# Patient Record
Sex: MALE | Race: BLACK OR AFRICAN AMERICAN | NOT HISPANIC OR LATINO | Employment: FULL TIME | URBAN - METROPOLITAN AREA
[De-identification: names, ages, dates, MRNs, and addresses within clinical notes are randomized per-mention and may not be internally consistent; named-entity substitution may affect disease eponyms.]

---

## 2017-01-13 ENCOUNTER — TELEPHONE (OUTPATIENT)
Dept: GASTROENTEROLOGY | Facility: CLINIC | Age: 47
End: 2017-01-13

## 2017-01-13 NOTE — TELEPHONE ENCOUNTER
Called pt and advised per Dr Kearney that she would like to see him for labs and f/u to his treatment of harvoni.  Pt verb understanding and made appt 01/19/2017 at 845am with Dr Kearney.  Pt was complaining about appt time and states he works nights and wanted a sooner time.  Advised this is the earliest time I have available.  Message sent to update Dr Kearney.

## 2017-01-13 NOTE — TELEPHONE ENCOUNTER
----- Message from Mimi Kearney MD sent at 1/12/2017  9:37 PM EST -----  I need this patient in ASAP.  He got Harvoni for HCV treatment and never returned for follow up.  Can we get him in the office as soon as possible for labs and evaluation!

## 2017-01-24 ENCOUNTER — TELEPHONE (OUTPATIENT)
Dept: GASTROENTEROLOGY | Facility: CLINIC | Age: 47
End: 2017-01-24

## 2017-01-24 DIAGNOSIS — B18.2 CHRONIC HEPATITIS C WITHOUT HEPATIC COMA (HCC): Primary | ICD-10-CM

## 2017-01-24 NOTE — TELEPHONE ENCOUNTER
----- Message from Mimi Kearney MD sent at 1/24/2017  2:00 PM EST -----  He missed his appt last week.  I really need him to come in.  I also really need labs.  I need to know when he started the Harvoni as well.  I will order labs for him to come get ASAP, and then he needs to be rescheduled with me as well.    Thanks

## 2017-01-24 NOTE — TELEPHONE ENCOUNTER
"Called pt and pt advised that Dr Kearney really needs him to come in for labs and f/u.  Pt verb understanding and states he has an appt this Thursday with Gini GEORGE .  He also states he \"started the Harvoni some time around the beginning of November\".  He did not know the exact date.  Advised would update Dr Kearney.   "

## 2017-01-26 ENCOUNTER — OFFICE VISIT (OUTPATIENT)
Dept: GASTROENTEROLOGY | Facility: CLINIC | Age: 47
End: 2017-01-26

## 2017-01-26 VITALS
BODY MASS INDEX: 45.1 KG/M2 | HEIGHT: 70 IN | SYSTOLIC BLOOD PRESSURE: 126 MMHG | WEIGHT: 315 LBS | DIASTOLIC BLOOD PRESSURE: 72 MMHG

## 2017-01-26 DIAGNOSIS — B18.2 HEP C W/O COMA, CHRONIC (HCC): Primary | ICD-10-CM

## 2017-01-26 PROCEDURE — 99213 OFFICE O/P EST LOW 20 MIN: CPT | Performed by: NURSE PRACTITIONER

## 2017-01-26 NOTE — MR AVS SNAPSHOT
Marlon Barnetty   1/26/2017 8:00 AM   Office Visit    Dept Phone:  [phone]   Encounter #:  [id number]    Provider:  KELLEE Wills   Department:  [facility] GASTROENTEROLOGY                Your Full Care Plan              Today's Medication Changes          These changes are accurate as of: 1/26/17  8:49 AM.  If you have any questions, ask your nurse or doctor.               Stop taking medication(s)listed here:     cyclobenzaprine 5 MG tablet   Commonly known as:  FLEXERIL   Stopped by:  KELLEE Wills           gabapentin 300 MG capsule   Commonly known as:  NEURONTIN   Stopped by:  KELLEE Wills           HARVONI  MG tablet   Generic drug:  Ledipasvir-Sofosbuvir   Stopped by:  KELLEE Wills                      Your Updated Medication List          This list is accurate as of: 1/26/17  8:49 AM.  Always use your most recent med list.                ALA-POOJA 3-0.5 % cream   Generic drug:  Clioquinol-HC       amLODIPine 2.5 MG tablet   Commonly known as:  NORVASC   Take 1 tablet by mouth Daily.       fluocinolone 0.025 % cream   Commonly known as:  SYNALAR       triamcinolone 0.1 % cream   Commonly known as:  KENALOG   Apply 1 application topically daily.               You Were Diagnosed With        Codes Comments    Hep C w/o coma, chronic    -  Primary ICD-10-CM: B18.2  ICD-9-CM: 070.54 Hep C dxed 2005, completed 8 wks Harvoni end of Dec 2016.  Labs today and return to office 3 mos for repeat labs/fu      Instructions     None    Patient Instructions History      Upcoming Appointments     Visit Type Date Time Department    FOLLOW UP 1/26/2017  8:00 AM [facility]    OFFICE VISIT 2/24/2017  7:40 AM [facility]    FOLLOW UP 3/23/2017  8:00 AM [facility]      MyChart Signup     [facility] MyChart allows you to send messages to your doctor, view your test results, renew your prescriptions, schedule  "appointments, and more. To sign up, go to [employer] and click on the Sign Up Now link in the New User? box. Enter your [employer] Activation Code exactly as it appears below along with the last four digits of your Social Security Number and your Date of Birth () to complete the sign-up process. If you do not sign up before the expiration date, you must request a new code.    [employer] Activation Code: [email or web address]  Expires: 2017  5:35 AM    If you have questions, you can email [email or web address] or call [phone] to talk to our [employer] staff. Remember, [employer] is NOT to be used for urgent needs. For medical emergencies, dial 911.               Other Info from Your Visit           Your Appointments     2017  7:40 AM EST   Office Visit with Vnace Spann MD   [facility] PRIMARY CARE (--)    [address]  [facility] [address] [phone]           Arrive 15 minutes prior to appointment.            Mar 23, 2017  8:00 AM EDT   Follow Up with Mimi Kearney MD   [facility] GASTROENTEROLOGY (--)    [address]  [facility] [address] [phone]           Arrive 15 minutes prior to appointment.              Allergies     No Known Allergies      Reason for Visit     Hepatits C     Harvoni treatment           Vital Signs     Blood Pressure Height Weight Body Mass Index Smoking Status       126/72 70\" (177.8 cm) 331 lb (150 kg) 47.49 kg/m2 Heavy Tobacco Smoker       Problems and Diagnoses Noted     Hep C w/o coma, chronic        "

## 2017-01-26 NOTE — PROGRESS NOTES
Chief Complaint   Patient presents with   • Hepatits C   • Harvoni treatment       Marlon Longoria is a  46 y.o. male here for a follow up visit for hep C    HPI    46-year-old -American male patient of Dr. Romero presents today for follow-up for hep C after completing Harvoni treatment.  Patient initially diagnosed with hep C in 2005, etiology was reported intravenous drug use.  Prior office notes indicate the patient was told in 2005 that his viral loads were too low to necessitate treatment.  He was seen in consultation by Dr. Romero and lab work was obtained indicating a positive hep C antibody, genotype of 1A and he was initiated on Harvoni treatment starting at the beginning of November.  He reports compliance with all 8 doses having completed the medication at the end of December.  Unfortunately, he missed several office and lab appointments and follow-up due to his work schedule.  Prior labs demonstrate negative hepatitis A and B antibodies, negative HIV and stable LFTs with low fibrosed or scores.  He presents today with no complaints of weight loss, abdominal pain, changes in his appetite, no jaundice and no bowel or bladder issues.  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  His only complaint is of fatigue which is baseline for him due to working night shift.    Past Medical History   Diagnosis Date   • Back pain    • Hep C w/o coma, chronic    • Hypertension        Current Outpatient Prescriptions   Medication Sig Dispense Refill   • ALA-POOJA 3-0.5 % cream 1 application 2 (Two) Times a Day.  1   • amLODIPine (NORVASC) 2.5 MG tablet Take 1 tablet by mouth Daily. 90 tablet 3   • fluocinolone (SYNALAR) 0.025 % cream Apply  topically 2 (Two) Times a Day.  0   • triamcinolone (KENALOG) 0.1 % cream Apply 1 application topically daily. 15 g 5     No current facility-administered medications for this visit.        PRN Meds:.    No Known Allergies    Social History     Social  "History   • Marital status: Single     Spouse name: N/A   • Number of children: N/A   • Years of education: N/A     Occupational History   • Mental health associate      Social History Main Topics   • Smoking status: Heavy Tobacco Smoker     Packs/day: 1.00     Years: 6.00     Types: Cigars     Start date: 2010   • Smokeless tobacco: Never Used   • Alcohol use No   • Drug use: Yes      Comment: pain pills   • Sexual activity: Defer     Other Topics Concern   • Not on file     Social History Narrative    Single, children ×2       Family History   Problem Relation Age of Onset   • Stroke Mother    • Heart disease Mother    • Heart disease Father        Review of Systems   Constitutional: Positive for appetite change. Negative for activity change.        Increased appetite with Harvoni   HENT: Negative for trouble swallowing.    Cardiovascular: Negative for chest pain and leg swelling.   Gastrointestinal: Negative for abdominal distention, abdominal pain, blood in stool, constipation, diarrhea, nausea and vomiting.   Musculoskeletal: Positive for back pain.   Skin:        Chronic skin changes r/t dermatitis       Vitals:    01/26/17 0811   BP: 126/72     Visit Vitals   • /72   • Ht 70\" (177.8 cm)   • Wt (!) 331 lb (150 kg)   • BMI 47.49 kg/m2     Physical Exam   Constitutional: He is oriented to person, place, and time. He appears well-developed and well-nourished.   Obese   HENT:   Head: Normocephalic and atraumatic.   Eyes:   Mild scleral icterus   Cardiovascular: Normal rate and regular rhythm.    Pulmonary/Chest: Effort normal and breath sounds normal.   Abdominal: Soft. Bowel sounds are normal. He exhibits no distension. There is no tenderness.   Obese, round   Neurological: He is alert and oriented to person, place, and time.   Skin: Skin is warm and dry. Rash noted.   Psychiatric: He has a normal mood and affect.       ASSESSMENT AND PLAN    Marlon was seen today for hepatits c and harvoni " treatment.    Diagnoses and all orders for this visit:    Hep C w/o coma, chronic  Comments:  Hep C dxed 2005, completed 8 wks Harvoni end of Dec 2016.  Labs today and return to office 3 mos for repeat labs/fu     labs performed today that were ordered previously to include a CBC, CMP, and a hep C Quant.  Patient will be contacted with lab results.  Follow-up appointment was made for the end of March when we will plan on additional labs to include a hep C Quant to determine clearing of the virus.

## 2017-01-28 LAB
ALBUMIN SERPL-MCNC: 3.8 G/DL (ref 3.5–5.2)
ALBUMIN/GLOB SERPL: 0.8 G/DL
ALP SERPL-CCNC: 66 U/L (ref 39–117)
ALT SERPL-CCNC: 14 U/L (ref 1–41)
AST SERPL-CCNC: 23 U/L (ref 1–40)
BASOPHILS # BLD AUTO: 0.06 10*3/MM3 (ref 0–0.2)
BASOPHILS NFR BLD AUTO: 0.7 % (ref 0–1.5)
BILIRUB SERPL-MCNC: 0.4 MG/DL (ref 0.1–1.2)
BUN SERPL-MCNC: 9 MG/DL (ref 6–20)
BUN/CREAT SERPL: 12.3 (ref 7–25)
CALCIUM SERPL-MCNC: 9.3 MG/DL (ref 8.6–10.5)
CHLORIDE SERPL-SCNC: 102 MMOL/L (ref 98–107)
CO2 SERPL-SCNC: 25.4 MMOL/L (ref 22–29)
CREAT SERPL-MCNC: 0.73 MG/DL (ref 0.76–1.27)
EOSINOPHIL # BLD AUTO: 0.17 10*3/MM3 (ref 0–0.7)
EOSINOPHIL NFR BLD AUTO: 2.1 % (ref 0.3–6.2)
ERYTHROCYTE [DISTWIDTH] IN BLOOD BY AUTOMATED COUNT: 14.2 % (ref 11.5–14.5)
GLOBULIN SER CALC-MCNC: 4.5 GM/DL
GLUCOSE SERPL-MCNC: 107 MG/DL (ref 65–99)
HCT VFR BLD AUTO: 43.2 % (ref 40.4–52.2)
HCV AB S/CO SERPL IA: >11 S/CO RATIO (ref 0–0.9)
HCV RNA SERPL NAA+PROBE-ACNC: NORMAL IU/ML
HGB BLD-MCNC: 14.3 G/DL (ref 13.7–17.6)
IMM GRANULOCYTES # BLD: 0 10*3/MM3 (ref 0–0.03)
IMM GRANULOCYTES NFR BLD: 0 % (ref 0–0.5)
LYMPHOCYTES # BLD AUTO: 3.57 10*3/MM3 (ref 0.9–4.8)
LYMPHOCYTES NFR BLD AUTO: 44.3 % (ref 19.6–45.3)
MCH RBC QN AUTO: 31 PG (ref 27–32.7)
MCHC RBC AUTO-ENTMCNC: 33.1 G/DL (ref 32.6–36.4)
MCV RBC AUTO: 93.5 FL (ref 79.8–96.2)
MONOCYTES # BLD AUTO: 0.56 10*3/MM3 (ref 0.2–1.2)
MONOCYTES NFR BLD AUTO: 7 % (ref 5–12)
NEUTROPHILS # BLD AUTO: 3.69 10*3/MM3 (ref 1.9–8.1)
NEUTROPHILS NFR BLD AUTO: 45.9 % (ref 42.7–76)
PLATELET # BLD AUTO: 171 10*3/MM3 (ref 140–500)
POTASSIUM SERPL-SCNC: 4.2 MMOL/L (ref 3.5–5.2)
PROT SERPL-MCNC: 8.3 G/DL (ref 6–8.5)
RBC # BLD AUTO: 4.62 10*6/MM3 (ref 4.6–6)
SODIUM SERPL-SCNC: 142 MMOL/L (ref 136–145)
TEST INFORMATION: NORMAL
WBC # BLD AUTO: 8.05 10*3/MM3 (ref 4.5–10.7)

## 2017-01-29 ENCOUNTER — RESULTS ENCOUNTER (OUTPATIENT)
Dept: GASTROENTEROLOGY | Facility: CLINIC | Age: 47
End: 2017-01-29

## 2017-01-29 DIAGNOSIS — B18.2 CHRONIC HEPATITIS C WITHOUT HEPATIC COMA (HCC): ICD-10-CM

## 2017-01-31 ENCOUNTER — TELEPHONE (OUTPATIENT)
Dept: GASTROENTEROLOGY | Facility: CLINIC | Age: 47
End: 2017-01-31

## 2017-01-31 NOTE — TELEPHONE ENCOUNTER
----- Message from Mimi Kearney MD sent at 1/29/2017 11:54 AM EST -----  Please let him know that his liver labs have normalized and he no longer has hep C virus in his blood .  I will follow up with him in March as we will need to again check his viral levels to make sure he has been cured of the virus.  thanks

## 2017-02-24 ENCOUNTER — OFFICE VISIT (OUTPATIENT)
Dept: INTERNAL MEDICINE | Age: 47
End: 2017-02-24

## 2017-02-24 VITALS
DIASTOLIC BLOOD PRESSURE: 94 MMHG | SYSTOLIC BLOOD PRESSURE: 134 MMHG | HEART RATE: 96 BPM | BODY MASS INDEX: 45.1 KG/M2 | HEIGHT: 70 IN | WEIGHT: 315 LBS | TEMPERATURE: 96.7 F | OXYGEN SATURATION: 97 %

## 2017-02-24 DIAGNOSIS — I10 ESSENTIAL HYPERTENSION: Primary | ICD-10-CM

## 2017-02-24 DIAGNOSIS — E66.9 OBESITY, UNSPECIFIED OBESITY SEVERITY, UNSPECIFIED OBESITY TYPE: ICD-10-CM

## 2017-02-24 DIAGNOSIS — N52.9 ERECTILE DYSFUNCTION, UNSPECIFIED ERECTILE DYSFUNCTION TYPE: ICD-10-CM

## 2017-02-24 PROCEDURE — 99214 OFFICE O/P EST MOD 30 MIN: CPT | Performed by: INTERNAL MEDICINE

## 2017-02-24 RX ORDER — AMLODIPINE BESYLATE 2.5 MG/1
5 TABLET ORAL DAILY
Qty: 90 TABLET | Refills: 3 | COMMUNITY
Start: 2017-02-24 | End: 2017-05-08 | Stop reason: SDUPTHER

## 2017-03-23 ENCOUNTER — TELEPHONE (OUTPATIENT)
Dept: GASTROENTEROLOGY | Facility: CLINIC | Age: 47
End: 2017-03-23

## 2017-03-23 DIAGNOSIS — B18.2 CHRONIC HEPATITIS C WITHOUT HEPATIC COMA (HCC): Primary | ICD-10-CM

## 2017-03-23 NOTE — TELEPHONE ENCOUNTER
----- Message from Mimi Kearney MD sent at 3/23/2017 11:51 AM EDT -----  He no showed again for his appointment today.  He does work nights and I think struggles to make his appointments.  He does absolutely need to have labs redrawn.  I am ordering those for him -- can we contact him and have him come in at his convenience in the next 2 weeks to get those drawn please.  Thank you

## 2017-03-28 ENCOUNTER — RESULTS ENCOUNTER (OUTPATIENT)
Dept: GASTROENTEROLOGY | Facility: CLINIC | Age: 47
End: 2017-03-28

## 2017-03-28 DIAGNOSIS — B18.2 CHRONIC HEPATITIS C WITHOUT HEPATIC COMA (HCC): ICD-10-CM

## 2017-03-28 NOTE — TELEPHONE ENCOUNTER
Call to pt.  Advise per Dr Kearney that with missing appt of 3/23/17, absolutely needs to have labs drawn.  Can be obtained within the next two weeks.  Pt verb understanding.  Appt scheduled for 4/4/17 @ 1000.

## 2017-03-30 ENCOUNTER — OFFICE VISIT (OUTPATIENT)
Dept: INTERNAL MEDICINE | Age: 47
End: 2017-03-30

## 2017-03-30 VITALS
OXYGEN SATURATION: 98 % | HEART RATE: 79 BPM | SYSTOLIC BLOOD PRESSURE: 126 MMHG | HEIGHT: 70 IN | BODY MASS INDEX: 45.1 KG/M2 | WEIGHT: 315 LBS | TEMPERATURE: 97.3 F | DIASTOLIC BLOOD PRESSURE: 88 MMHG

## 2017-03-30 DIAGNOSIS — I10 ESSENTIAL HYPERTENSION: Primary | ICD-10-CM

## 2017-03-30 DIAGNOSIS — Z71.82 EXERCISE COUNSELING: ICD-10-CM

## 2017-03-30 PROCEDURE — 99213 OFFICE O/P EST LOW 20 MIN: CPT | Performed by: INTERNAL MEDICINE

## 2017-03-30 NOTE — PROGRESS NOTES
Subjective   Marlon Longoria is a 46 y.o. male.     History of Present Illness patient's last seen by me roughly 6 weeks ago.  That time his blood pressure was elevated.  We did increase his Norvasc to 5 mg per day was asked to return today to check blood pressure.  He notes spotty compliance with medication but is watching his salt.  Breanna lites were done in January 2017, lipids August 2016, all values are acceptable.    Exercise: There is a family history of heart disease, patient would like to start exercising and we did discuss the utility of having a preexercise stress test once his blood pressure is under control.  He would like to proceed with that at this time.      Review of Systems   Respiratory: Negative for chest tightness and shortness of breath.    Cardiovascular: Negative for chest pain and palpitations.   Neurological: Negative for dizziness, syncope, speech difficulty, weakness, light-headedness and headaches.     Medication list, problem list, past medical history reviewed noted to made today.    Objective   Physical Exam   Constitutional: He is oriented to person, place, and time. He appears well-developed. No distress.   obese   Cardiovascular: Normal rate, regular rhythm, normal heart sounds and intact distal pulses.  Exam reveals no gallop and no friction rub.    No murmur heard.  Pulmonary/Chest: Effort normal and breath sounds normal. He has no wheezes. He has no rales.   Musculoskeletal: He exhibits no edema.   Neurological: He is alert and oriented to person, place, and time.   Skin: Skin is warm and dry. No rash noted.   Psychiatric: He has a normal mood and affect. His behavior is normal. Judgment and thought content normal.   Nursing note and vitals reviewed.      Assessment/Plan   Marlon was seen today for hypertension.    Diagnoses and all orders for this visit:    Essential hypertension    Exercise counseling  -     Cardiovascular stress test       #1 hypertension stable on current  medical regimen.  Plan: Same meds, blood pressure check 6 months.    #2 exercise counseling, stress test, follow-up results by mail and advise patient as to safe level of metabolic expenditure.

## 2017-05-08 DIAGNOSIS — I10 ESSENTIAL HYPERTENSION: Primary | ICD-10-CM

## 2017-05-08 RX ORDER — AMLODIPINE BESYLATE 5 MG/1
5 TABLET ORAL DAILY
Qty: 90 TABLET | Refills: 1 | Status: SHIPPED | OUTPATIENT
Start: 2017-05-08 | End: 2019-05-28

## 2017-06-13 DIAGNOSIS — B18.2 CHRONIC HEPATITIS C WITHOUT HEPATIC COMA (HCC): Primary | ICD-10-CM

## 2017-06-14 ENCOUNTER — TELEPHONE (OUTPATIENT)
Dept: GASTROENTEROLOGY | Facility: CLINIC | Age: 47
End: 2017-06-14

## 2017-06-14 NOTE — TELEPHONE ENCOUNTER
----- Message from Mimi Kearney MD sent at 6/13/2017  5:09 PM EDT -----  This dude needs to come in for labs-- treated for Hep C and he has only followed up 1x.  Also needs office follow up. Thanks

## 2017-06-16 NOTE — TELEPHONE ENCOUNTER
No emergency contact, and no alternative # available on HIPPA form.  VM to pt with urgent message to contact office.

## 2017-06-18 ENCOUNTER — RESULTS ENCOUNTER (OUTPATIENT)
Dept: GASTROENTEROLOGY | Facility: CLINIC | Age: 47
End: 2017-06-18

## 2017-06-18 DIAGNOSIS — B18.2 CHRONIC HEPATITIS C WITHOUT HEPATIC COMA (HCC): ICD-10-CM

## 2017-06-20 NOTE — TELEPHONE ENCOUNTER
Prior message received that pt works 3 shift.  Attempt call at [address] [facility] left with urgent request for pt to schedule f/u appt with DR Kearney.

## 2017-11-10 RX ORDER — AMLODIPINE BESYLATE 2.5 MG/1
TABLET ORAL
Qty: 90 TABLET | Refills: 3 | Status: SHIPPED | OUTPATIENT
Start: 2017-11-10 | End: 2018-12-16 | Stop reason: SDUPTHER

## 2018-12-18 RX ORDER — AMLODIPINE BESYLATE 2.5 MG/1
2.5 TABLET ORAL DAILY
Qty: 90 TABLET | Refills: 0 | Status: SHIPPED | OUTPATIENT
Start: 2018-12-18 | End: 2019-04-15 | Stop reason: DRUGHIGH

## 2019-04-11 ENCOUNTER — HOSPITAL ENCOUNTER (EMERGENCY)
Facility: HOSPITAL | Age: 49
Discharge: HOME | End: 2019-04-11
Admitting: EMERGENCY MEDICINE

## 2019-04-11 ENCOUNTER — APPOINTMENT (OUTPATIENT)
Dept: CT IMAGING | Facility: HOSPITAL | Age: 49
End: 2019-04-11

## 2019-04-11 VITALS
WEIGHT: 315 LBS | BODY MASS INDEX: 45.1 KG/M2 | OXYGEN SATURATION: 95 % | TEMPERATURE: 98.4 F | HEART RATE: 82 BPM | RESPIRATION RATE: 18 BRPM | SYSTOLIC BLOOD PRESSURE: 157 MMHG | DIASTOLIC BLOOD PRESSURE: 102 MMHG | HEIGHT: 70 IN

## 2019-04-11 DIAGNOSIS — R10.9 RIGHT FLANK PAIN: Primary | ICD-10-CM

## 2019-04-11 DIAGNOSIS — K74.60 CIRRHOSIS OF LIVER WITHOUT ASCITES, UNSPECIFIED HEPATIC CIRRHOSIS TYPE (HCC): ICD-10-CM

## 2019-04-11 DIAGNOSIS — E87.1 HYPONATREMIA: ICD-10-CM

## 2019-04-11 DIAGNOSIS — K76.9 LIVER LESION: ICD-10-CM

## 2019-04-11 LAB
ALBUMIN SERPL-MCNC: 3.7 G/DL (ref 3.5–5.2)
ALBUMIN/GLOB SERPL: 0.6 G/DL
ALP SERPL-CCNC: 59 U/L (ref 39–117)
ALT SERPL W P-5'-P-CCNC: 10 U/L (ref 1–41)
ANION GAP SERPL CALCULATED.3IONS-SCNC: 11.1 MMOL/L
AST SERPL-CCNC: 13 U/L (ref 1–40)
BACTERIA UR QL AUTO: ABNORMAL /HPF
BASOPHILS # BLD AUTO: 0.02 10*3/MM3 (ref 0–0.2)
BASOPHILS NFR BLD AUTO: 0.2 % (ref 0–1.5)
BILIRUB SERPL-MCNC: 0.8 MG/DL (ref 0.2–1.2)
BILIRUB UR QL STRIP: NEGATIVE
BUN BLD-MCNC: 11 MG/DL (ref 6–20)
BUN/CREAT SERPL: 17.7 (ref 7–25)
CALCIUM SPEC-SCNC: 9.9 MG/DL (ref 8.6–10.5)
CHLORIDE SERPL-SCNC: 88 MMOL/L (ref 98–107)
CLARITY UR: CLEAR
CO2 SERPL-SCNC: 26.9 MMOL/L (ref 22–29)
COLOR UR: ABNORMAL
CREAT BLD-MCNC: 0.62 MG/DL (ref 0.76–1.27)
DEPRECATED RDW RBC AUTO: 45.7 FL (ref 37–54)
EOSINOPHIL # BLD AUTO: 0 10*3/MM3 (ref 0–0.4)
EOSINOPHIL NFR BLD AUTO: 0 % (ref 0.3–6.2)
ERYTHROCYTE [DISTWIDTH] IN BLOOD BY AUTOMATED COUNT: 14.5 % (ref 12.3–15.4)
GFR SERPL CREATININE-BSD FRML MDRD: >150 ML/MIN/1.73
GLOBULIN UR ELPH-MCNC: 6.2 GM/DL
GLUCOSE BLD-MCNC: 129 MG/DL (ref 65–99)
GLUCOSE UR STRIP-MCNC: NEGATIVE MG/DL
HCT VFR BLD AUTO: 43.9 % (ref 37.5–51)
HGB BLD-MCNC: 13.7 G/DL (ref 13–17.7)
HGB UR QL STRIP.AUTO: ABNORMAL
HOLD SPECIMEN: NORMAL
HOLD SPECIMEN: NORMAL
HYALINE CASTS UR QL AUTO: ABNORMAL /LPF
IMM GRANULOCYTES # BLD AUTO: 0.08 10*3/MM3 (ref 0–0.05)
IMM GRANULOCYTES NFR BLD AUTO: 0.6 % (ref 0–0.5)
KETONES UR QL STRIP: ABNORMAL
LEUKOCYTE ESTERASE UR QL STRIP.AUTO: NEGATIVE
LIPASE SERPL-CCNC: 11 U/L (ref 13–60)
LYMPHOCYTES # BLD AUTO: 1.07 10*3/MM3 (ref 0.7–3.1)
LYMPHOCYTES NFR BLD AUTO: 8.2 % (ref 19.6–45.3)
MCH RBC QN AUTO: 26.8 PG (ref 26.6–33)
MCHC RBC AUTO-ENTMCNC: 31.2 G/DL (ref 31.5–35.7)
MCV RBC AUTO: 85.9 FL (ref 79–97)
MONOCYTES # BLD AUTO: 0.79 10*3/MM3 (ref 0.1–0.9)
MONOCYTES NFR BLD AUTO: 6 % (ref 5–12)
NEUTROPHILS # BLD AUTO: 11.15 10*3/MM3 (ref 1.4–7)
NEUTROPHILS NFR BLD AUTO: 85 % (ref 42.7–76)
NITRITE UR QL STRIP: NEGATIVE
NRBC BLD AUTO-RTO: 0 /100 WBC (ref 0–0)
PH UR STRIP.AUTO: 5.5 [PH] (ref 5–8)
PLATELET # BLD AUTO: 221 10*3/MM3 (ref 140–450)
PMV BLD AUTO: 9.7 FL (ref 6–12)
POTASSIUM BLD-SCNC: 4.1 MMOL/L (ref 3.5–5.2)
PROT SERPL-MCNC: 9.9 G/DL (ref 6–8.5)
PROT UR QL STRIP: ABNORMAL
RBC # BLD AUTO: 5.11 10*6/MM3 (ref 4.14–5.8)
RBC # UR: ABNORMAL /HPF
REF LAB TEST METHOD: ABNORMAL
SODIUM BLD-SCNC: 126 MMOL/L (ref 136–145)
SP GR UR STRIP: >=1.03 (ref 1–1.03)
SQUAMOUS #/AREA URNS HPF: ABNORMAL /HPF
UROBILINOGEN UR QL STRIP: ABNORMAL
WBC NRBC COR # BLD: 13.11 10*3/MM3 (ref 3.4–10.8)
WBC UR QL AUTO: ABNORMAL /HPF
WHOLE BLOOD HOLD SPECIMEN: NORMAL
WHOLE BLOOD HOLD SPECIMEN: NORMAL

## 2019-04-11 PROCEDURE — 25010000002 KETOROLAC TROMETHAMINE PER 15 MG: Performed by: EMERGENCY MEDICINE

## 2019-04-11 PROCEDURE — 25010000002 IOPAMIDOL 61 % SOLUTION: Performed by: NURSE PRACTITIONER

## 2019-04-11 PROCEDURE — 74177 CT ABD & PELVIS W/CONTRAST: CPT

## 2019-04-11 PROCEDURE — 25010000002 ONDANSETRON PER 1 MG: Performed by: NURSE PRACTITIONER

## 2019-04-11 PROCEDURE — 99284 EMERGENCY DEPT VISIT MOD MDM: CPT

## 2019-04-11 PROCEDURE — 25010000002 HYDROMORPHONE 1 MG/ML SOLUTION: Performed by: NURSE PRACTITIONER

## 2019-04-11 PROCEDURE — 85025 COMPLETE CBC W/AUTO DIFF WBC: CPT | Performed by: NURSE PRACTITIONER

## 2019-04-11 PROCEDURE — 96376 TX/PRO/DX INJ SAME DRUG ADON: CPT

## 2019-04-11 PROCEDURE — 96374 THER/PROPH/DIAG INJ IV PUSH: CPT

## 2019-04-11 PROCEDURE — 80053 COMPREHEN METABOLIC PANEL: CPT | Performed by: NURSE PRACTITIONER

## 2019-04-11 PROCEDURE — 25010000002 MORPHINE PER 10 MG: Performed by: NURSE PRACTITIONER

## 2019-04-11 PROCEDURE — 83690 ASSAY OF LIPASE: CPT | Performed by: NURSE PRACTITIONER

## 2019-04-11 PROCEDURE — 81001 URINALYSIS AUTO W/SCOPE: CPT | Performed by: NURSE PRACTITIONER

## 2019-04-11 PROCEDURE — 25010000002 ONDANSETRON PER 1 MG: Performed by: EMERGENCY MEDICINE

## 2019-04-11 PROCEDURE — 96375 TX/PRO/DX INJ NEW DRUG ADDON: CPT

## 2019-04-11 RX ORDER — ONDANSETRON 2 MG/ML
4 INJECTION INTRAMUSCULAR; INTRAVENOUS ONCE
Status: COMPLETED | OUTPATIENT
Start: 2019-04-11 | End: 2019-04-11

## 2019-04-11 RX ORDER — NAPROXEN 500 MG/1
500 TABLET ORAL 2 TIMES DAILY WITH MEALS
Qty: 30 TABLET | Refills: 0 | Status: SHIPPED | OUTPATIENT
Start: 2019-04-11 | End: 2019-06-03

## 2019-04-11 RX ORDER — KETOROLAC TROMETHAMINE 30 MG/ML
15 INJECTION, SOLUTION INTRAMUSCULAR; INTRAVENOUS ONCE
Status: COMPLETED | OUTPATIENT
Start: 2019-04-11 | End: 2019-04-11

## 2019-04-11 RX ORDER — OXYCODONE HYDROCHLORIDE AND ACETAMINOPHEN 5; 325 MG/1; MG/1
1-2 TABLET ORAL EVERY 6 HOURS PRN
Qty: 12 TABLET | Refills: 0 | Status: ON HOLD | OUTPATIENT
Start: 2019-04-11 | End: 2019-04-20 | Stop reason: SDUPTHER

## 2019-04-11 RX ORDER — MORPHINE SULFATE 2 MG/ML
4 INJECTION, SOLUTION INTRAMUSCULAR; INTRAVENOUS ONCE
Status: COMPLETED | OUTPATIENT
Start: 2019-04-11 | End: 2019-04-11

## 2019-04-11 RX ADMIN — KETOROLAC TROMETHAMINE 15 MG: 30 INJECTION INTRAMUSCULAR; INTRAVENOUS at 20:42

## 2019-04-11 RX ADMIN — SODIUM CHLORIDE 1000 ML: 9 INJECTION, SOLUTION INTRAVENOUS at 18:50

## 2019-04-11 RX ADMIN — ONDANSETRON 4 MG: 2 INJECTION INTRAMUSCULAR; INTRAVENOUS at 20:23

## 2019-04-11 RX ADMIN — IOPAMIDOL 85 ML: 612 INJECTION, SOLUTION INTRAVENOUS at 19:48

## 2019-04-11 RX ADMIN — MORPHINE SULFATE 4 MG: 2 INJECTION, SOLUTION INTRAMUSCULAR; INTRAVENOUS at 18:49

## 2019-04-11 RX ADMIN — HYDROMORPHONE HYDROCHLORIDE 1 MG: 1 INJECTION, SOLUTION INTRAMUSCULAR; INTRAVENOUS; SUBCUTANEOUS at 20:13

## 2019-04-11 RX ADMIN — ONDANSETRON 4 MG: 2 INJECTION INTRAMUSCULAR; INTRAVENOUS at 18:49

## 2019-04-11 NOTE — ED PROVIDER NOTES
EMERGENCY DEPARTMENT ENCOUNTER    Room Number:  40/40  Date seen:  4/13/2019  Time seen: 6:20 PM  PCP: Vance Spann MD (Inactive)   History provided by- patient    HPI:  Chief complaint: back pain  Context:Marlon Longoria is a 48 y.o. male who has hx of HTN, hepatitis C, and lupus. He presents to the ED with c/o diffuse lower back pain radiating to the right abdomen that started about 2 days ago. It is exacerbated by movement. He also reports having nausea. He denies past hx of back problems, past hx of kidney stones, recent injury or trauma, vomiting, diarrhea, pain and difficulty with urination, bladder incontinence, bowel incontinence, saddle anesthesia, chest pain, dyspnea, sensory loss, motor loss, fever, and chills. Pt has no other complaints at this time.      Onset: gradual  Location: diffuse lower back  Radiation: right abdomen  Duration: started about 2 days ago  Timing: constant  Character: pain  Aggravating Factors: movement  Alleviating Factors: remaining still  Severity: moderate    MEDICAL RECORD REVIEW  2 years ago, sodium was 142 and WBC count was 8.05.       ALLERGIES  Patient has no known allergies.    PAST MEDICAL HISTORY  Active Ambulatory Problems     Diagnosis Date Noted   • Hypertension 08/23/2016   • Hep C w/o coma, chronic (CMS/HCC) 08/23/2016   • Encounter for immunization 08/23/2016   • Dermatitis 08/23/2016   • Back pain 08/23/2016   • Abnormal transaminases 08/30/2016   • Obesity 10/24/2016   • Erectile dysfunction 02/24/2017     Resolved Ambulatory Problems     Diagnosis Date Noted   • No Resolved Ambulatory Problems     Past Medical History:   Diagnosis Date   • Back pain    • Hep C w/o coma, chronic (CMS/HCC)    • Hypertension        PAST SURGICAL HISTORY  History reviewed. No pertinent surgical history.    FAMILY HISTORY  Family History   Problem Relation Age of Onset   • Stroke Mother    • Heart disease Mother    • Heart disease Father        SOCIAL HISTORY  Social History      Socioeconomic History   • Marital status: Single     Spouse name: Not on file   • Number of children: Not on file   • Years of education: Not on file   • Highest education level: Not on file   Occupational History   • Occupation: Mental health associate   Tobacco Use   • Smoking status: Heavy Tobacco Smoker     Packs/day: 1.00     Years: 6.00     Pack years: 6.00     Types: Cigars     Start date: 2010   • Smokeless tobacco: Never Used   • Tobacco comment: Pt declined medication at this time.   Substance and Sexual Activity   • Alcohol use: No   • Drug use: Yes     Comment: pain pills   • Sexual activity: Defer   Social History Narrative    Single, children ×2       REVIEW OF SYSTEMS  Review of Systems   Constitutional: Negative for chills and fever.   HENT: Negative for sore throat.    Respiratory: Negative for shortness of breath.    Cardiovascular: Negative for chest pain.   Gastrointestinal: Positive for abdominal pain (diffuse lower back pain radiating to the right abdomen) and nausea. Negative for diarrhea and vomiting.   Genitourinary: Negative for difficulty urinating and dysuria.   Musculoskeletal: Positive for back pain (diffuse lower back pain radiating to the right abdomen).   Skin: Negative for rash.   Neurological: Negative for dizziness, weakness and numbness.   Psychiatric/Behavioral: The patient is not nervous/anxious.        PHYSICAL EXAM  ED Triage Vitals   Temp Heart Rate Resp BP SpO2   04/11/19 1751 04/11/19 1720 04/11/19 1720 04/11/19 1720 04/11/19 1720   98.4 °F (36.9 °C) 86 16 160/100 98 % WNL      Temp src Heart Rate Source Patient Position BP Location FiO2 (%)   04/11/19 1751 -- -- -- --   Tympanic         Physical Exam   Constitutional: He is oriented to person, place, and time. He appears distressed (mild due to pain).   HENT:   Head: Normocephalic.   Mouth/Throat: Mucous membranes are normal.   Eyes: EOM are normal. Pupils are equal, round, and reactive to light.   Neck: Normal range  of motion. Neck supple.   Cardiovascular: Normal rate, regular rhythm and normal heart sounds.   Pulmonary/Chest: Effort normal and breath sounds normal. No respiratory distress. He has no decreased breath sounds. He has no wheezes. He has no rhonchi. He has no rales.   Abdominal: Soft. There is tenderness in the right lower quadrant. There is no rebound and no guarding.   Musculoskeletal:   Diffuse lower back tenderness   Neurological: He is alert and oriented to person, place, and time. He has normal sensation.   Skin: Skin is warm and dry.   Psychiatric: Mood and affect normal.   Nursing note and vitals reviewed.      LAB RESULTS  No results found for this or any previous visit (from the past 24 hour(s)).    I ordered the above labs and reviewed the results    RADIOLOGY  CT Abdomen Pelvis With Contrast   Final Result           1. Indeterminate hepatic mass, possibility of malignant neoplasm not   excluded, further evaluation recommended.   2. Cirrhotic change of the liver. Hepatosplenomegaly.   3. Nonobstructive nephrolithiasis. Left renal cyst.   4. No acute inflammatory process of bowel identified, follow up as   indications persist.       Discussed by telephone with Dr. Seaman for Lesa Shin at 2010,   04/11/2019.               This report was finalized on 4/11/2019 8:14 PM by Dr. Marty Yun M.D.                MEDICATIONS GIVEN IN ER  Medications   sodium chloride 0.9 % bolus 1,000 mL (0 mL Intravenous Stopped 4/11/19 2121)   ondansetron (ZOFRAN) injection 4 mg (4 mg Intravenous Given 4/11/19 1849)   morphine injection 4 mg (4 mg Intravenous Given 4/11/19 1849)   iopamidol (ISOVUE-300) 61 % injection 100 mL (85 mL Intravenous Given by Other 4/11/19 1948)   HYDROmorphone (DILAUDID) injection 1 mg (1 mg Intravenous Given 4/11/19 2013)   ondansetron (ZOFRAN) injection 4 mg (4 mg Intravenous Given 4/11/19 2023)   ketorolac (TORADOL) injection 15 mg (15 mg Intravenous Given 4/11/19 2042)            PROCEDURES  Procedures      PROGRESS AND CONSULTS    Progress Notes:    ED Course as of Apr 13 0655   Thu Apr 11, 2019 1955 Sodium: (!) 126 [JS]      ED Course User Index  [JS] Lesa Shin, APRN     1823- Ordered morphine for pain, zofran for nausea, and IV fluids for hydration. Ordered CT abd/pel, blood work, lipase, and UA for further evaluation.     1957- Reviewed pt's history and workup with Dr. Seaman.  After a bedside evaluation; Dr Seaman agrees with the plan of care.     1959- Per RN, pt still has pain. Ordered dilaudid.     2000- Transferred care to Dr Seaman. Pending CT abd/pel and final disposition. See Dr Seaman's note for details.       DIAGNOSIS  Final diagnoses:   Right flank pain   Cirrhosis of liver without ascites, unspecified hepatic cirrhosis type (CMS/HCC)   Hyponatremia   Liver lesion          Stevo, Adnin  04/11/19 2018       Lesa Shin, APRN  04/13/19 0656

## 2019-04-11 NOTE — ED NOTES
Pt requesting more pain medication. Provider made aware. No new orders     Zaynab Scott, RN  04/11/19 1958

## 2019-04-12 NOTE — DISCHARGE INSTRUCTIONS
I have given medicine to take as needed for pain in your right flank.  I recommend no strenuous activity involving the lower back until improved.  Your CT scan shows an abnormality of the liver, this may need further workup including a possible MRI of the liver.  Please contact your primary care provider or GI, Dr. Mimi Romero for further workup.

## 2019-04-12 NOTE — ED PROVIDER NOTES
MD ATTESTATION NOTE    The URVASHI and I have discussed this patient's history, physical exam, and treatment plan.  I have reviewed the documentation and personally had a face to face interaction with the patient. I affirm the documentation and agree with the treatment and plan.  The attached note describes my personal findings.      History  48-year-old male with a history of hepatitis hypertension lupus presents with right-sided back pain rating towards the right abdomen.  I discussed the case and findings with JAZMINE Shin and have reviewed the pertinent labs and CT scan    Physical Exam  Alert oriented x3, mild distress  Lungs clear to auscultation bilaterally  Heart rate and rhythm  Abdomen is soft there is mild right-sided abdominal tenderness  Back there is pain with range of motion of the back predominantly on the right side  Neuro strength and sensation is normal in bilateral lower extremities    Disposition  Initially history was suggestive of possible renal stone however there are no obstructing stones on CT scan.  CT did show cirrhosis and questionable liver lesion.  I have discussed with the patient the findings of the CT scan and the need for follow-up of this liver lesion.  He sees Dr. Romero is a GI doctor and Dr. Spann is a primary care doctor.  I suspect that his right back pain is musculoskeletal in nature and will treat with pain meds.  His hyponatremia is noted and worse from 2017 but seems to be asymptomatic here and likely related to his chronic liver disease, I do not feel admission is warranted for the sodium of 126.    Final diagnoses:   Right flank pain   Cirrhosis of liver without ascites, unspecified hepatic cirrhosis type (CMS/HCC)   Hyponatremia   Liver lesion       DISPOSITION  Mukesh Palacios MD  04/11/19 2035       Mukesh Seaman MD  04/11/19 2124

## 2019-04-15 ENCOUNTER — HOSPITAL ENCOUNTER (INPATIENT)
Facility: HOSPITAL | Age: 49
LOS: 11 days | Discharge: NURSING, REHAB OR LONG-TERM CARE | End: 2019-04-26
Attending: EMERGENCY MEDICINE | Admitting: HOSPITALIST

## 2019-04-15 ENCOUNTER — APPOINTMENT (OUTPATIENT)
Dept: MRI IMAGING | Facility: HOSPITAL | Age: 49
End: 2019-04-15

## 2019-04-15 ENCOUNTER — APPOINTMENT (OUTPATIENT)
Dept: CT IMAGING | Facility: HOSPITAL | Age: 49
End: 2019-04-15

## 2019-04-15 DIAGNOSIS — M54.5 ACUTE BILATERAL LOW BACK PAIN, WITH SCIATICA PRESENCE UNSPECIFIED: ICD-10-CM

## 2019-04-15 DIAGNOSIS — R29.898 WEAKNESS OF BOTH LOWER EXTREMITIES: Primary | ICD-10-CM

## 2019-04-15 DIAGNOSIS — R26.89 DECREASED MOBILITY: ICD-10-CM

## 2019-04-15 PROBLEM — I10 HTN (HYPERTENSION): Status: ACTIVE | Noted: 2019-04-15

## 2019-04-15 PROBLEM — R16.0 LIVER MASS: Status: ACTIVE | Noted: 2019-04-15

## 2019-04-15 PROBLEM — B19.20 HEPATITIS C: Status: ACTIVE | Noted: 2019-04-15

## 2019-04-15 LAB
ALBUMIN SERPL-MCNC: 3.7 G/DL (ref 3.5–5.2)
ALBUMIN/GLOB SERPL: 0.6 G/DL
ALP SERPL-CCNC: 74 U/L (ref 39–117)
ALT SERPL W P-5'-P-CCNC: 13 U/L (ref 1–41)
ANION GAP SERPL CALCULATED.3IONS-SCNC: 11.9 MMOL/L
AST SERPL-CCNC: 11 U/L (ref 1–40)
BACTERIA UR QL AUTO: ABNORMAL /HPF
BASOPHILS # BLD AUTO: 0.03 10*3/MM3 (ref 0–0.2)
BASOPHILS NFR BLD AUTO: 0.2 % (ref 0–1.5)
BILIRUB SERPL-MCNC: 0.8 MG/DL (ref 0.2–1.2)
BILIRUB UR QL STRIP: NEGATIVE
BUN BLD-MCNC: 8 MG/DL (ref 6–20)
BUN/CREAT SERPL: 12.7 (ref 7–25)
CALCIUM SPEC-SCNC: 9 MG/DL (ref 8.6–10.5)
CHLORIDE SERPL-SCNC: 92 MMOL/L (ref 98–107)
CLARITY UR: CLEAR
CO2 SERPL-SCNC: 28.1 MMOL/L (ref 22–29)
COLOR UR: ABNORMAL
CREAT BLD-MCNC: 0.63 MG/DL (ref 0.76–1.27)
DEPRECATED RDW RBC AUTO: 44.6 FL (ref 37–54)
EOSINOPHIL # BLD AUTO: 0.03 10*3/MM3 (ref 0–0.4)
EOSINOPHIL NFR BLD AUTO: 0.2 % (ref 0.3–6.2)
ERYTHROCYTE [DISTWIDTH] IN BLOOD BY AUTOMATED COUNT: 14.7 % (ref 12.3–15.4)
GFR SERPL CREATININE-BSD FRML MDRD: >150 ML/MIN/1.73
GLOBULIN UR ELPH-MCNC: 5.7 GM/DL
GLUCOSE BLD-MCNC: 109 MG/DL (ref 65–99)
GLUCOSE UR STRIP-MCNC: NEGATIVE MG/DL
HCT VFR BLD AUTO: 47.3 % (ref 37.5–51)
HGB BLD-MCNC: 14.8 G/DL (ref 13–17.7)
HGB UR QL STRIP.AUTO: ABNORMAL
HYALINE CASTS UR QL AUTO: ABNORMAL /LPF
IMM GRANULOCYTES # BLD AUTO: 0.05 10*3/MM3 (ref 0–0.05)
IMM GRANULOCYTES NFR BLD AUTO: 0.4 % (ref 0–0.5)
KETONES UR QL STRIP: NEGATIVE
LEUKOCYTE ESTERASE UR QL STRIP.AUTO: ABNORMAL
LYMPHOCYTES # BLD AUTO: 2.02 10*3/MM3 (ref 0.7–3.1)
LYMPHOCYTES NFR BLD AUTO: 15.4 % (ref 19.6–45.3)
MCH RBC QN AUTO: 26.1 PG (ref 26.6–33)
MCHC RBC AUTO-ENTMCNC: 31.3 G/DL (ref 31.5–35.7)
MCV RBC AUTO: 83.4 FL (ref 79–97)
MONOCYTES # BLD AUTO: 1.04 10*3/MM3 (ref 0.1–0.9)
MONOCYTES NFR BLD AUTO: 8 % (ref 5–12)
NEUTROPHILS # BLD AUTO: 9.91 10*3/MM3 (ref 1.4–7)
NEUTROPHILS NFR BLD AUTO: 75.8 % (ref 42.7–76)
NITRITE UR QL STRIP: NEGATIVE
NRBC BLD AUTO-RTO: 0 /100 WBC (ref 0–0)
PH UR STRIP.AUTO: 6.5 [PH] (ref 5–8)
PLATELET # BLD AUTO: 262 10*3/MM3 (ref 140–450)
PMV BLD AUTO: 9.7 FL (ref 6–12)
POTASSIUM BLD-SCNC: 3.6 MMOL/L (ref 3.5–5.2)
PROT SERPL-MCNC: 9.4 G/DL (ref 6–8.5)
PROT UR QL STRIP: ABNORMAL
RBC # BLD AUTO: 5.67 10*6/MM3 (ref 4.14–5.8)
RBC # UR: ABNORMAL /HPF
REF LAB TEST METHOD: ABNORMAL
SODIUM BLD-SCNC: 132 MMOL/L (ref 136–145)
SP GR UR STRIP: 1.03 (ref 1–1.03)
SQUAMOUS #/AREA URNS HPF: ABNORMAL /HPF
UROBILINOGEN UR QL STRIP: ABNORMAL
WBC NRBC COR # BLD: 13.08 10*3/MM3 (ref 3.4–10.8)
WBC UR QL AUTO: ABNORMAL /HPF

## 2019-04-15 PROCEDURE — 80307 DRUG TEST PRSMV CHEM ANLYZR: CPT | Performed by: HOSPITALIST

## 2019-04-15 PROCEDURE — 80053 COMPREHEN METABOLIC PANEL: CPT | Performed by: PHYSICIAN ASSISTANT

## 2019-04-15 PROCEDURE — 81001 URINALYSIS AUTO W/SCOPE: CPT | Performed by: PHYSICIAN ASSISTANT

## 2019-04-15 PROCEDURE — G0480 DRUG TEST DEF 1-7 CLASSES: HCPCS | Performed by: HOSPITALIST

## 2019-04-15 PROCEDURE — 25010000002 KETOROLAC TROMETHAMINE PER 15 MG: Performed by: PHYSICIAN ASSISTANT

## 2019-04-15 PROCEDURE — 72128 CT CHEST SPINE W/O DYE: CPT

## 2019-04-15 PROCEDURE — 85025 COMPLETE CBC W/AUTO DIFF WBC: CPT | Performed by: PHYSICIAN ASSISTANT

## 2019-04-15 PROCEDURE — 99284 EMERGENCY DEPT VISIT MOD MDM: CPT

## 2019-04-15 PROCEDURE — 72148 MRI LUMBAR SPINE W/O DYE: CPT

## 2019-04-15 PROCEDURE — 25010000002 LORAZEPAM PER 2 MG: Performed by: PHYSICIAN ASSISTANT

## 2019-04-15 RX ORDER — ORPHENADRINE CITRATE 30 MG/ML
60 INJECTION INTRAMUSCULAR; INTRAVENOUS EVERY 12 HOURS
Status: DISCONTINUED | OUTPATIENT
Start: 2019-04-15 | End: 2019-04-15

## 2019-04-15 RX ORDER — HYDROXYCHLOROQUINE SULFATE 200 MG/1
200 TABLET, FILM COATED ORAL 2 TIMES DAILY
Status: DISCONTINUED | OUTPATIENT
Start: 2019-04-15 | End: 2019-04-27 | Stop reason: HOSPADM

## 2019-04-15 RX ORDER — KETOROLAC TROMETHAMINE 15 MG/ML
15 INJECTION, SOLUTION INTRAMUSCULAR; INTRAVENOUS ONCE
Status: COMPLETED | OUTPATIENT
Start: 2019-04-15 | End: 2019-04-15

## 2019-04-15 RX ORDER — HYDROXYCHLOROQUINE SULFATE 200 MG/1
200 TABLET, FILM COATED ORAL 2 TIMES DAILY
COMMUNITY
End: 2019-06-03

## 2019-04-15 RX ORDER — SODIUM CHLORIDE 0.9 % (FLUSH) 0.9 %
3-10 SYRINGE (ML) INJECTION AS NEEDED
Status: DISCONTINUED | OUTPATIENT
Start: 2019-04-15 | End: 2019-04-27 | Stop reason: HOSPADM

## 2019-04-15 RX ORDER — SODIUM CHLORIDE 0.9 % (FLUSH) 0.9 %
3 SYRINGE (ML) INJECTION EVERY 12 HOURS SCHEDULED
Status: DISCONTINUED | OUTPATIENT
Start: 2019-04-15 | End: 2019-04-27 | Stop reason: HOSPADM

## 2019-04-15 RX ORDER — TRIAMCINOLONE ACETONIDE 1 MG/G
1 CREAM TOPICAL DAILY PRN
COMMUNITY
End: 2019-06-03

## 2019-04-15 RX ORDER — CYCLOBENZAPRINE HCL 10 MG
10 TABLET ORAL ONCE
Status: COMPLETED | OUTPATIENT
Start: 2019-04-15 | End: 2019-04-15

## 2019-04-15 RX ORDER — ACETAMINOPHEN 325 MG/1
650 TABLET ORAL EVERY 4 HOURS PRN
Status: DISCONTINUED | OUTPATIENT
Start: 2019-04-15 | End: 2019-04-27 | Stop reason: HOSPADM

## 2019-04-15 RX ORDER — LORAZEPAM 2 MG/ML
0.5 INJECTION INTRAMUSCULAR ONCE
Status: DISCONTINUED | OUTPATIENT
Start: 2019-04-15 | End: 2019-04-15

## 2019-04-15 RX ORDER — ONDANSETRON 4 MG/1
4 TABLET, FILM COATED ORAL EVERY 6 HOURS PRN
Status: DISCONTINUED | OUTPATIENT
Start: 2019-04-15 | End: 2019-04-27 | Stop reason: HOSPADM

## 2019-04-15 RX ORDER — ONDANSETRON 2 MG/ML
4 INJECTION INTRAMUSCULAR; INTRAVENOUS EVERY 6 HOURS PRN
Status: DISCONTINUED | OUTPATIENT
Start: 2019-04-15 | End: 2019-04-27 | Stop reason: HOSPADM

## 2019-04-15 RX ORDER — AMLODIPINE BESYLATE 5 MG/1
5 TABLET ORAL DAILY
Status: DISCONTINUED | OUTPATIENT
Start: 2019-04-16 | End: 2019-04-16

## 2019-04-15 RX ORDER — LORAZEPAM 2 MG/ML
0.5 INJECTION INTRAMUSCULAR ONCE
Status: COMPLETED | OUTPATIENT
Start: 2019-04-15 | End: 2019-04-15

## 2019-04-15 RX ADMIN — KETOROLAC TROMETHAMINE 15 MG: 15 INJECTION, SOLUTION INTRAMUSCULAR; INTRAVENOUS at 15:42

## 2019-04-15 RX ADMIN — LORAZEPAM 0.5 MG: 2 INJECTION INTRAMUSCULAR; INTRAVENOUS at 16:01

## 2019-04-15 RX ADMIN — SODIUM CHLORIDE 1000 ML: 9 INJECTION, SOLUTION INTRAVENOUS at 15:37

## 2019-04-15 RX ADMIN — CYCLOBENZAPRINE 10 MG: 10 TABLET, FILM COATED ORAL at 15:04

## 2019-04-16 LAB
ALBUMIN SERPL-MCNC: 2.5 G/DL (ref 3.5–5.2)
ALBUMIN/GLOB SERPL: 0.4 G/DL
ALP SERPL-CCNC: 57 U/L (ref 39–117)
ALT SERPL W P-5'-P-CCNC: 11 U/L (ref 1–41)
AMPHET+METHAMPHET UR QL: NEGATIVE
ANION GAP SERPL CALCULATED.3IONS-SCNC: 13.4 MMOL/L
AST SERPL-CCNC: 9 U/L (ref 1–40)
BARBITURATES UR QL SCN: NEGATIVE
BASOPHILS # BLD AUTO: 0.05 10*3/MM3 (ref 0–0.2)
BASOPHILS NFR BLD AUTO: 0.4 % (ref 0–1.5)
BENZODIAZ UR QL SCN: NEGATIVE
BILIRUB SERPL-MCNC: 0.8 MG/DL (ref 0.2–1.2)
BUN BLD-MCNC: 10 MG/DL (ref 6–20)
BUN/CREAT SERPL: 18.2 (ref 7–25)
CALCIUM SPEC-SCNC: 8.7 MG/DL (ref 8.6–10.5)
CANNABINOIDS SERPL QL: NEGATIVE
CHLORIDE SERPL-SCNC: 95 MMOL/L (ref 98–107)
CK SERPL-CCNC: 22 U/L (ref 20–200)
CK SERPL-CCNC: 32 U/L (ref 20–200)
CO2 SERPL-SCNC: 23.6 MMOL/L (ref 22–29)
COCAINE UR QL: NEGATIVE
CREAT BLD-MCNC: 0.55 MG/DL (ref 0.76–1.27)
DEPRECATED RDW RBC AUTO: 43.5 FL (ref 37–54)
EOSINOPHIL # BLD AUTO: 0.04 10*3/MM3 (ref 0–0.4)
EOSINOPHIL NFR BLD AUTO: 0.3 % (ref 0.3–6.2)
ERYTHROCYTE [DISTWIDTH] IN BLOOD BY AUTOMATED COUNT: 14.5 % (ref 12.3–15.4)
GFR SERPL CREATININE-BSD FRML MDRD: >150 ML/MIN/1.73
GLOBULIN UR ELPH-MCNC: 5.8 GM/DL
GLUCOSE BLD-MCNC: 134 MG/DL (ref 65–99)
HCT VFR BLD AUTO: 41.9 % (ref 37.5–51)
HGB BLD-MCNC: 13.8 G/DL (ref 13–17.7)
LYMPHOCYTES # BLD AUTO: 2.21 10*3/MM3 (ref 0.7–3.1)
LYMPHOCYTES NFR BLD AUTO: 16.8 % (ref 19.6–45.3)
MAGNESIUM SERPL-MCNC: 2.1 MG/DL (ref 1.6–2.6)
MCH RBC QN AUTO: 27.3 PG (ref 26.6–33)
MCHC RBC AUTO-ENTMCNC: 32.9 G/DL (ref 31.5–35.7)
MCV RBC AUTO: 83 FL (ref 79–97)
METHADONE UR QL SCN: NEGATIVE
MONOCYTES # BLD AUTO: 1.06 10*3/MM3 (ref 0.1–0.9)
MONOCYTES NFR BLD AUTO: 8.1 % (ref 5–12)
NEUTROPHILS # BLD AUTO: 9.7 10*3/MM3 (ref 1.4–7)
NEUTROPHILS NFR BLD AUTO: 73.9 % (ref 42.7–76)
OPIATES UR QL: POSITIVE
OXYCODONE UR QL SCN: POSITIVE
PHOSPHATE SERPL-MCNC: 3.2 MG/DL (ref 2.5–4.5)
PLATELET # BLD AUTO: 236 10*3/MM3 (ref 140–450)
PMV BLD AUTO: 9.6 FL (ref 6–12)
POTASSIUM BLD-SCNC: 3.4 MMOL/L (ref 3.5–5.2)
PROT SERPL-MCNC: 8.3 G/DL (ref 6–8.5)
RBC # BLD AUTO: 5.05 10*6/MM3 (ref 4.14–5.8)
RPR SER QL: NORMAL
SODIUM BLD-SCNC: 132 MMOL/L (ref 136–145)
TSH SERPL DL<=0.05 MIU/L-ACNC: 0.88 MIU/ML (ref 0.27–4.2)
VIT B12 BLD-MCNC: 574 PG/ML (ref 211–946)
WBC NRBC COR # BLD: 13.13 10*3/MM3 (ref 3.4–10.8)

## 2019-04-16 PROCEDURE — 86592 SYPHILIS TEST NON-TREP QUAL: CPT | Performed by: HOSPITALIST

## 2019-04-16 PROCEDURE — 82550 ASSAY OF CK (CPK): CPT | Performed by: HOSPITALIST

## 2019-04-16 PROCEDURE — 84443 ASSAY THYROID STIM HORMONE: CPT | Performed by: HOSPITALIST

## 2019-04-16 PROCEDURE — 80053 COMPREHEN METABOLIC PANEL: CPT | Performed by: HOSPITALIST

## 2019-04-16 PROCEDURE — 83735 ASSAY OF MAGNESIUM: CPT | Performed by: HOSPITALIST

## 2019-04-16 PROCEDURE — 25010000002 ONDANSETRON PER 1 MG: Performed by: HOSPITALIST

## 2019-04-16 PROCEDURE — 99255 IP/OBS CONSLTJ NEW/EST HI 80: CPT | Performed by: PSYCHIATRY & NEUROLOGY

## 2019-04-16 PROCEDURE — 85027 COMPLETE CBC AUTOMATED: CPT | Performed by: HOSPITALIST

## 2019-04-16 PROCEDURE — 84100 ASSAY OF PHOSPHORUS: CPT | Performed by: HOSPITALIST

## 2019-04-16 PROCEDURE — 82550 ASSAY OF CK (CPK): CPT | Performed by: PSYCHIATRY & NEUROLOGY

## 2019-04-16 PROCEDURE — 82607 VITAMIN B-12: CPT | Performed by: HOSPITALIST

## 2019-04-16 RX ORDER — HYDROCODONE BITARTRATE AND ACETAMINOPHEN 5; 325 MG/1; MG/1
1 TABLET ORAL EVERY 6 HOURS PRN
Status: DISCONTINUED | OUTPATIENT
Start: 2019-04-16 | End: 2019-04-17

## 2019-04-16 RX ORDER — AMLODIPINE BESYLATE 5 MG/1
5 TABLET ORAL DAILY
Status: DISCONTINUED | OUTPATIENT
Start: 2019-04-16 | End: 2019-04-17

## 2019-04-16 RX ORDER — SIMETHICONE 80 MG
80 TABLET,CHEWABLE ORAL 4 TIMES DAILY PRN
Status: DISCONTINUED | OUTPATIENT
Start: 2019-04-16 | End: 2019-04-27 | Stop reason: HOSPADM

## 2019-04-16 RX ADMIN — HYDROCODONE BITARTRATE AND ACETAMINOPHEN 1 TABLET: 5; 325 TABLET ORAL at 14:15

## 2019-04-16 RX ADMIN — SIMETHICONE CHEW TAB 80 MG 80 MG: 80 TABLET ORAL at 23:41

## 2019-04-16 RX ADMIN — AMLODIPINE BESYLATE 5 MG: 5 TABLET ORAL at 02:03

## 2019-04-16 RX ADMIN — SODIUM CHLORIDE, PRESERVATIVE FREE 3 ML: 5 INJECTION INTRAVENOUS at 21:51

## 2019-04-16 RX ADMIN — HYDROXYCHLOROQUINE SULFATE 200 MG: 200 TABLET, FILM COATED ORAL at 08:09

## 2019-04-16 RX ADMIN — HYDROXYCHLOROQUINE SULFATE 200 MG: 200 TABLET, FILM COATED ORAL at 21:51

## 2019-04-16 RX ADMIN — HYDROCODONE BITARTRATE AND ACETAMINOPHEN 1 TABLET: 5; 325 TABLET ORAL at 21:54

## 2019-04-16 RX ADMIN — SODIUM CHLORIDE, PRESERVATIVE FREE 3 ML: 5 INJECTION INTRAVENOUS at 01:34

## 2019-04-16 RX ADMIN — ACETAMINOPHEN 650 MG: 325 TABLET, FILM COATED ORAL at 08:19

## 2019-04-16 RX ADMIN — ONDANSETRON 4 MG: 2 INJECTION INTRAMUSCULAR; INTRAVENOUS at 14:15

## 2019-04-16 RX ADMIN — ONDANSETRON 4 MG: 2 INJECTION INTRAMUSCULAR; INTRAVENOUS at 08:10

## 2019-04-16 RX ADMIN — SODIUM CHLORIDE, PRESERVATIVE FREE 3 ML: 5 INJECTION INTRAVENOUS at 08:10

## 2019-04-16 RX ADMIN — ACETAMINOPHEN 650 MG: 325 TABLET, FILM COATED ORAL at 00:48

## 2019-04-17 ENCOUNTER — APPOINTMENT (OUTPATIENT)
Dept: MRI IMAGING | Facility: HOSPITAL | Age: 49
End: 2019-04-17

## 2019-04-17 LAB — HBA1C MFR BLD: 6.14 % (ref 4.8–5.6)

## 2019-04-17 PROCEDURE — 83036 HEMOGLOBIN GLYCOSYLATED A1C: CPT | Performed by: NURSE PRACTITIONER

## 2019-04-17 PROCEDURE — 25010000002 ONDANSETRON PER 1 MG: Performed by: HOSPITALIST

## 2019-04-17 PROCEDURE — 99233 SBSQ HOSP IP/OBS HIGH 50: CPT | Performed by: NURSE PRACTITIONER

## 2019-04-17 PROCEDURE — 97110 THERAPEUTIC EXERCISES: CPT

## 2019-04-17 PROCEDURE — 97162 PT EVAL MOD COMPLEX 30 MIN: CPT

## 2019-04-17 PROCEDURE — 25010000002 LORAZEPAM PER 2 MG: Performed by: HOSPITALIST

## 2019-04-17 RX ORDER — AMLODIPINE BESYLATE 10 MG/1
10 TABLET ORAL DAILY
Status: DISCONTINUED | OUTPATIENT
Start: 2019-04-18 | End: 2019-04-27 | Stop reason: HOSPADM

## 2019-04-17 RX ORDER — AMLODIPINE BESYLATE 5 MG/1
5 TABLET ORAL ONCE
Status: COMPLETED | OUTPATIENT
Start: 2019-04-17 | End: 2019-04-17

## 2019-04-17 RX ORDER — OXYCODONE HYDROCHLORIDE AND ACETAMINOPHEN 5; 325 MG/1; MG/1
1 TABLET ORAL EVERY 4 HOURS PRN
Status: DISCONTINUED | OUTPATIENT
Start: 2019-04-17 | End: 2019-04-27 | Stop reason: HOSPADM

## 2019-04-17 RX ORDER — LORAZEPAM 2 MG/ML
1 INJECTION INTRAMUSCULAR ONCE
Status: COMPLETED | OUTPATIENT
Start: 2019-04-17 | End: 2019-04-17

## 2019-04-17 RX ADMIN — OXYCODONE AND ACETAMINOPHEN 1 TABLET: 5; 325 TABLET ORAL at 14:55

## 2019-04-17 RX ADMIN — AMLODIPINE BESYLATE 5 MG: 5 TABLET ORAL at 14:55

## 2019-04-17 RX ADMIN — ONDANSETRON 4 MG: 2 INJECTION INTRAMUSCULAR; INTRAVENOUS at 22:16

## 2019-04-17 RX ADMIN — SIMETHICONE CHEW TAB 80 MG 80 MG: 80 TABLET ORAL at 06:23

## 2019-04-17 RX ADMIN — AMLODIPINE BESYLATE 5 MG: 5 TABLET ORAL at 08:59

## 2019-04-17 RX ADMIN — HYDROXYCHLOROQUINE SULFATE 200 MG: 200 TABLET, FILM COATED ORAL at 08:59

## 2019-04-17 RX ADMIN — ONDANSETRON 4 MG: 2 INJECTION INTRAMUSCULAR; INTRAVENOUS at 06:23

## 2019-04-17 RX ADMIN — HYDROXYCHLOROQUINE SULFATE 200 MG: 200 TABLET, FILM COATED ORAL at 19:49

## 2019-04-17 RX ADMIN — SODIUM HYPOCHLORITE 946 ML: 1.25 SOLUTION TOPICAL at 19:49

## 2019-04-17 RX ADMIN — SIMETHICONE CHEW TAB 80 MG 80 MG: 80 TABLET ORAL at 19:49

## 2019-04-17 RX ADMIN — SODIUM CHLORIDE, PRESERVATIVE FREE 3 ML: 5 INJECTION INTRAVENOUS at 19:49

## 2019-04-17 RX ADMIN — ONDANSETRON 4 MG: 2 INJECTION INTRAMUSCULAR; INTRAVENOUS at 00:10

## 2019-04-17 RX ADMIN — LORAZEPAM 1 MG: 2 INJECTION INTRAMUSCULAR; INTRAVENOUS at 07:19

## 2019-04-17 RX ADMIN — HYDROCODONE BITARTRATE AND ACETAMINOPHEN 1 TABLET: 5; 325 TABLET ORAL at 10:07

## 2019-04-17 RX ADMIN — SODIUM HYPOCHLORITE 946 ML: 1.25 SOLUTION TOPICAL at 12:00

## 2019-04-17 RX ADMIN — HYDROCODONE BITARTRATE AND ACETAMINOPHEN 1 TABLET: 5; 325 TABLET ORAL at 03:58

## 2019-04-17 RX ADMIN — OXYCODONE AND ACETAMINOPHEN 1 TABLET: 5; 325 TABLET ORAL at 19:49

## 2019-04-17 RX ADMIN — SODIUM CHLORIDE, PRESERVATIVE FREE 3 ML: 5 INJECTION INTRAVENOUS at 10:50

## 2019-04-18 LAB
ALPHA-FETOPROTEIN: 3.38 NG/ML (ref 0–8.3)
INR PPP: 1.15 (ref 0.9–1.1)
PROTHROMBIN TIME: 14.4 SECONDS (ref 11.7–14.2)

## 2019-04-18 PROCEDURE — 85610 PROTHROMBIN TIME: CPT | Performed by: INTERNAL MEDICINE

## 2019-04-18 PROCEDURE — 25010000002 ONDANSETRON PER 1 MG: Performed by: HOSPITALIST

## 2019-04-18 PROCEDURE — 87522 HEPATITIS C REVRS TRNSCRPJ: CPT | Performed by: INTERNAL MEDICINE

## 2019-04-18 PROCEDURE — 99254 IP/OBS CNSLTJ NEW/EST MOD 60: CPT | Performed by: INTERNAL MEDICINE

## 2019-04-18 PROCEDURE — 97110 THERAPEUTIC EXERCISES: CPT

## 2019-04-18 PROCEDURE — 82105 ALPHA-FETOPROTEIN SERUM: CPT | Performed by: INTERNAL MEDICINE

## 2019-04-18 RX ORDER — LORAZEPAM 2 MG/ML
1 INJECTION INTRAMUSCULAR
Status: COMPLETED | OUTPATIENT
Start: 2019-04-18 | End: 2019-04-19

## 2019-04-18 RX ORDER — OXYCODONE AND ACETAMINOPHEN 10; 325 MG/1; MG/1
1 TABLET ORAL EVERY 4 HOURS PRN
Status: DISCONTINUED | OUTPATIENT
Start: 2019-04-18 | End: 2019-04-27 | Stop reason: HOSPADM

## 2019-04-18 RX ORDER — BISACODYL 10 MG
10 SUPPOSITORY, RECTAL RECTAL DAILY PRN
Status: DISCONTINUED | OUTPATIENT
Start: 2019-04-18 | End: 2019-04-27 | Stop reason: HOSPADM

## 2019-04-18 RX ORDER — SENNA AND DOCUSATE SODIUM 50; 8.6 MG/1; MG/1
2 TABLET, FILM COATED ORAL 2 TIMES DAILY PRN
Status: DISCONTINUED | OUTPATIENT
Start: 2019-04-18 | End: 2019-04-22

## 2019-04-18 RX ORDER — SENNA AND DOCUSATE SODIUM 50; 8.6 MG/1; MG/1
2 TABLET, FILM COATED ORAL NIGHTLY
Status: DISCONTINUED | OUTPATIENT
Start: 2019-04-18 | End: 2019-04-18

## 2019-04-18 RX ADMIN — HYDROXYCHLOROQUINE SULFATE 200 MG: 200 TABLET, FILM COATED ORAL at 19:49

## 2019-04-18 RX ADMIN — ONDANSETRON 4 MG: 2 INJECTION INTRAMUSCULAR; INTRAVENOUS at 04:43

## 2019-04-18 RX ADMIN — HYDROXYCHLOROQUINE SULFATE 200 MG: 200 TABLET, FILM COATED ORAL at 08:50

## 2019-04-18 RX ADMIN — OXYCODONE HYDROCHLORIDE AND ACETAMINOPHEN 1 TABLET: 10; 325 TABLET ORAL at 18:45

## 2019-04-18 RX ADMIN — AMLODIPINE BESYLATE 10 MG: 10 TABLET ORAL at 08:50

## 2019-04-18 RX ADMIN — SODIUM CHLORIDE, PRESERVATIVE FREE 3 ML: 5 INJECTION INTRAVENOUS at 19:50

## 2019-04-18 RX ADMIN — OXYCODONE AND ACETAMINOPHEN 1 TABLET: 5; 325 TABLET ORAL at 00:07

## 2019-04-18 RX ADMIN — OXYCODONE AND ACETAMINOPHEN 1 TABLET: 5; 325 TABLET ORAL at 08:50

## 2019-04-18 RX ADMIN — SODIUM CHLORIDE, PRESERVATIVE FREE 3 ML: 5 INJECTION INTRAVENOUS at 08:50

## 2019-04-18 RX ADMIN — OXYCODONE AND ACETAMINOPHEN 1 TABLET: 5; 325 TABLET ORAL at 04:43

## 2019-04-19 ENCOUNTER — APPOINTMENT (OUTPATIENT)
Dept: MRI IMAGING | Facility: HOSPITAL | Age: 49
End: 2019-04-19

## 2019-04-19 LAB
ALBUMIN SERPL-MCNC: 3.3 G/DL (ref 3.5–5.2)
ALBUMIN/GLOB SERPL: 0.6 G/DL
ALP SERPL-CCNC: 60 U/L (ref 39–117)
ALT SERPL W P-5'-P-CCNC: 23 U/L (ref 1–41)
ANION GAP SERPL CALCULATED.3IONS-SCNC: 15.6 MMOL/L
AST SERPL-CCNC: 28 U/L (ref 1–40)
BASOPHILS # BLD AUTO: 0.05 10*3/MM3 (ref 0–0.2)
BASOPHILS NFR BLD AUTO: 0.6 % (ref 0–1.5)
BILIRUB SERPL-MCNC: 0.6 MG/DL (ref 0.2–1.2)
BUN BLD-MCNC: 11 MG/DL (ref 6–20)
BUN/CREAT SERPL: 19.3 (ref 7–25)
CALCIUM SPEC-SCNC: 9.4 MG/DL (ref 8.6–10.5)
CEA SERPL-MCNC: 1.59 NG/ML
CHLORIDE SERPL-SCNC: 97 MMOL/L (ref 98–107)
CO2 SERPL-SCNC: 20.4 MMOL/L (ref 22–29)
CREAT BLD-MCNC: 0.57 MG/DL (ref 0.76–1.27)
DEPRECATED RDW RBC AUTO: 49.7 FL (ref 37–54)
EOSINOPHIL # BLD AUTO: 0.11 10*3/MM3 (ref 0–0.4)
EOSINOPHIL NFR BLD AUTO: 1.2 % (ref 0.3–6.2)
ERYTHROCYTE [DISTWIDTH] IN BLOOD BY AUTOMATED COUNT: 15.2 % (ref 12.3–15.4)
GFR SERPL CREATININE-BSD FRML MDRD: >150 ML/MIN/1.73
GLOBULIN UR ELPH-MCNC: 5.4 GM/DL
GLUCOSE BLD-MCNC: 101 MG/DL (ref 65–99)
HCT VFR BLD AUTO: 50.6 % (ref 37.5–51)
HGB BLD-MCNC: 15.1 G/DL (ref 13–17.7)
IMM GRANULOCYTES # BLD AUTO: 0.05 10*3/MM3 (ref 0–0.05)
IMM GRANULOCYTES NFR BLD AUTO: 0.6 % (ref 0–0.5)
LYMPHOCYTES # BLD AUTO: 2.14 10*3/MM3 (ref 0.7–3.1)
LYMPHOCYTES NFR BLD AUTO: 24.3 % (ref 19.6–45.3)
MCH RBC QN AUTO: 27 PG (ref 26.6–33)
MCHC RBC AUTO-ENTMCNC: 29.8 G/DL (ref 31.5–35.7)
MCV RBC AUTO: 90.4 FL (ref 79–97)
METHADONE, URINE: NEGATIVE
MONOCYTES # BLD AUTO: 0.78 10*3/MM3 (ref 0.1–0.9)
MONOCYTES NFR BLD AUTO: 8.8 % (ref 5–12)
NEUTROPHILS # BLD AUTO: 5.69 10*3/MM3 (ref 1.4–7)
NEUTROPHILS NFR BLD AUTO: 64.5 % (ref 42.7–76)
NRBC BLD AUTO-RTO: 0.1 /100 WBC (ref 0–0)
PLATELET # BLD AUTO: 229 10*3/MM3 (ref 140–450)
PMV BLD AUTO: 9.4 FL (ref 6–12)
POTASSIUM BLD-SCNC: 4.7 MMOL/L (ref 3.5–5.2)
PROT SERPL-MCNC: 8.7 G/DL (ref 6–8.5)
RBC # BLD AUTO: 5.6 10*6/MM3 (ref 4.14–5.8)
SODIUM BLD-SCNC: 133 MMOL/L (ref 136–145)
WBC NRBC COR # BLD: 8.82 10*3/MM3 (ref 3.4–10.8)

## 2019-04-19 PROCEDURE — 99232 SBSQ HOSP IP/OBS MODERATE 35: CPT | Performed by: INTERNAL MEDICINE

## 2019-04-19 PROCEDURE — 97110 THERAPEUTIC EXERCISES: CPT

## 2019-04-19 PROCEDURE — 82378 CARCINOEMBRYONIC ANTIGEN: CPT | Performed by: INTERNAL MEDICINE

## 2019-04-19 PROCEDURE — 25010000002 LORAZEPAM PER 2 MG: Performed by: INTERNAL MEDICINE

## 2019-04-19 PROCEDURE — 97165 OT EVAL LOW COMPLEX 30 MIN: CPT

## 2019-04-19 PROCEDURE — 80053 COMPREHEN METABOLIC PANEL: CPT | Performed by: INTERNAL MEDICINE

## 2019-04-19 PROCEDURE — 85025 COMPLETE CBC W/AUTO DIFF WBC: CPT | Performed by: INTERNAL MEDICINE

## 2019-04-19 RX ADMIN — ONDANSETRON 4 MG: 4 TABLET, FILM COATED ORAL at 14:05

## 2019-04-19 RX ADMIN — OXYCODONE HYDROCHLORIDE AND ACETAMINOPHEN 1 TABLET: 10; 325 TABLET ORAL at 17:22

## 2019-04-19 RX ADMIN — SODIUM CHLORIDE, PRESERVATIVE FREE 3 ML: 5 INJECTION INTRAVENOUS at 09:24

## 2019-04-19 RX ADMIN — OXYCODONE HYDROCHLORIDE AND ACETAMINOPHEN 1 TABLET: 10; 325 TABLET ORAL at 01:43

## 2019-04-19 RX ADMIN — HYDROXYCHLOROQUINE SULFATE 200 MG: 200 TABLET, FILM COATED ORAL at 09:23

## 2019-04-19 RX ADMIN — SODIUM CHLORIDE, PRESERVATIVE FREE 3 ML: 5 INJECTION INTRAVENOUS at 21:29

## 2019-04-19 RX ADMIN — LORAZEPAM 1 MG: 2 INJECTION INTRAMUSCULAR; INTRAVENOUS at 09:36

## 2019-04-19 RX ADMIN — AMLODIPINE BESYLATE 10 MG: 10 TABLET ORAL at 09:23

## 2019-04-19 RX ADMIN — OXYCODONE HYDROCHLORIDE AND ACETAMINOPHEN 1 TABLET: 10; 325 TABLET ORAL at 09:26

## 2019-04-19 RX ADMIN — HYDROXYCHLOROQUINE SULFATE 200 MG: 200 TABLET, FILM COATED ORAL at 21:28

## 2019-04-20 LAB
ANION GAP SERPL CALCULATED.3IONS-SCNC: 16.5 MMOL/L
BUN BLD-MCNC: 11 MG/DL (ref 6–20)
BUN/CREAT SERPL: 19.6 (ref 7–25)
CALCIUM SPEC-SCNC: 9.5 MG/DL (ref 8.6–10.5)
CHLORIDE SERPL-SCNC: 95 MMOL/L (ref 98–107)
CO2 SERPL-SCNC: 20.5 MMOL/L (ref 22–29)
CREAT BLD-MCNC: 0.56 MG/DL (ref 0.76–1.27)
DEPRECATED RDW RBC AUTO: 46.1 FL (ref 37–54)
ERYTHROCYTE [DISTWIDTH] IN BLOOD BY AUTOMATED COUNT: 15 % (ref 12.3–15.4)
GFR SERPL CREATININE-BSD FRML MDRD: >150 ML/MIN/1.73
GLUCOSE BLD-MCNC: 109 MG/DL (ref 65–99)
HCT VFR BLD AUTO: 45.5 % (ref 37.5–51)
HGB BLD-MCNC: 14.4 G/DL (ref 13–17.7)
MCH RBC QN AUTO: 26.7 PG (ref 26.6–33)
MCHC RBC AUTO-ENTMCNC: 31.6 G/DL (ref 31.5–35.7)
MCV RBC AUTO: 84.4 FL (ref 79–97)
PLATELET # BLD AUTO: 246 10*3/MM3 (ref 140–450)
PMV BLD AUTO: 9.6 FL (ref 6–12)
POTASSIUM BLD-SCNC: 4 MMOL/L (ref 3.5–5.2)
RBC # BLD AUTO: 5.39 10*6/MM3 (ref 4.14–5.8)
SODIUM BLD-SCNC: 132 MMOL/L (ref 136–145)
WBC NRBC COR # BLD: 9.47 10*3/MM3 (ref 3.4–10.8)

## 2019-04-20 PROCEDURE — 85027 COMPLETE CBC AUTOMATED: CPT | Performed by: INTERNAL MEDICINE

## 2019-04-20 PROCEDURE — 99232 SBSQ HOSP IP/OBS MODERATE 35: CPT | Performed by: INTERNAL MEDICINE

## 2019-04-20 PROCEDURE — 80048 BASIC METABOLIC PNL TOTAL CA: CPT | Performed by: INTERNAL MEDICINE

## 2019-04-20 PROCEDURE — 97110 THERAPEUTIC EXERCISES: CPT

## 2019-04-20 RX ORDER — OXYCODONE HYDROCHLORIDE AND ACETAMINOPHEN 5; 325 MG/1; MG/1
1 TABLET ORAL EVERY 6 HOURS PRN
Qty: 12 TABLET | Refills: 0 | Status: SHIPPED | OUTPATIENT
Start: 2019-04-20 | End: 2019-04-26 | Stop reason: HOSPADM

## 2019-04-20 RX ORDER — SIMETHICONE 80 MG
80 TABLET,CHEWABLE ORAL 4 TIMES DAILY PRN
Start: 2019-04-20 | End: 2019-06-03

## 2019-04-20 RX ORDER — SENNA AND DOCUSATE SODIUM 50; 8.6 MG/1; MG/1
2 TABLET, FILM COATED ORAL 2 TIMES DAILY PRN
Start: 2019-04-20 | End: 2019-04-26 | Stop reason: HOSPADM

## 2019-04-20 RX ORDER — ONDANSETRON 4 MG/1
4 TABLET, FILM COATED ORAL EVERY 8 HOURS PRN
Start: 2019-04-20 | End: 2019-06-03

## 2019-04-20 RX ADMIN — HYDROXYCHLOROQUINE SULFATE 200 MG: 200 TABLET, FILM COATED ORAL at 21:26

## 2019-04-20 RX ADMIN — SODIUM CHLORIDE, PRESERVATIVE FREE 3 ML: 5 INJECTION INTRAVENOUS at 09:37

## 2019-04-20 RX ADMIN — OXYCODONE HYDROCHLORIDE AND ACETAMINOPHEN 1 TABLET: 10; 325 TABLET ORAL at 00:25

## 2019-04-20 RX ADMIN — OXYCODONE HYDROCHLORIDE AND ACETAMINOPHEN 1 TABLET: 10; 325 TABLET ORAL at 18:29

## 2019-04-20 RX ADMIN — HYDROXYCHLOROQUINE SULFATE 200 MG: 200 TABLET, FILM COATED ORAL at 09:04

## 2019-04-20 RX ADMIN — AMLODIPINE BESYLATE 10 MG: 10 TABLET ORAL at 09:04

## 2019-04-21 LAB
ANION GAP SERPL CALCULATED.3IONS-SCNC: 16.3 MMOL/L
BUN BLD-MCNC: 9 MG/DL (ref 6–20)
BUN/CREAT SERPL: 15.8 (ref 7–25)
CALCIUM SPEC-SCNC: 9.3 MG/DL (ref 8.6–10.5)
CHLORIDE SERPL-SCNC: 97 MMOL/L (ref 98–107)
CO2 SERPL-SCNC: 20.7 MMOL/L (ref 22–29)
CREAT BLD-MCNC: 0.57 MG/DL (ref 0.76–1.27)
DEPRECATED RDW RBC AUTO: 46.5 FL (ref 37–54)
ERYTHROCYTE [DISTWIDTH] IN BLOOD BY AUTOMATED COUNT: 15 % (ref 12.3–15.4)
GFR SERPL CREATININE-BSD FRML MDRD: >150 ML/MIN/1.73
GLUCOSE BLD-MCNC: 110 MG/DL (ref 65–99)
HCT VFR BLD AUTO: 45.4 % (ref 37.5–51)
HCV RNA SERPL NAA+PROBE-ACNC: NORMAL IU/ML
HGB BLD-MCNC: 14.3 G/DL (ref 13–17.7)
MCH RBC QN AUTO: 26.8 PG (ref 26.6–33)
MCHC RBC AUTO-ENTMCNC: 31.5 G/DL (ref 31.5–35.7)
MCV RBC AUTO: 85.2 FL (ref 79–97)
PLATELET # BLD AUTO: 219 10*3/MM3 (ref 140–450)
PMV BLD AUTO: 9.6 FL (ref 6–12)
POTASSIUM BLD-SCNC: 4 MMOL/L (ref 3.5–5.2)
RBC # BLD AUTO: 5.33 10*6/MM3 (ref 4.14–5.8)
SODIUM BLD-SCNC: 134 MMOL/L (ref 136–145)
TEST INFORMATION: NORMAL
WBC NRBC COR # BLD: 9.38 10*3/MM3 (ref 3.4–10.8)

## 2019-04-21 PROCEDURE — 85027 COMPLETE CBC AUTOMATED: CPT | Performed by: INTERNAL MEDICINE

## 2019-04-21 PROCEDURE — 80048 BASIC METABOLIC PNL TOTAL CA: CPT | Performed by: INTERNAL MEDICINE

## 2019-04-21 PROCEDURE — 99232 SBSQ HOSP IP/OBS MODERATE 35: CPT | Performed by: INTERNAL MEDICINE

## 2019-04-21 PROCEDURE — 97110 THERAPEUTIC EXERCISES: CPT

## 2019-04-21 RX ADMIN — OXYCODONE HYDROCHLORIDE AND ACETAMINOPHEN 1 TABLET: 10; 325 TABLET ORAL at 21:18

## 2019-04-21 RX ADMIN — OXYCODONE HYDROCHLORIDE AND ACETAMINOPHEN 1 TABLET: 10; 325 TABLET ORAL at 05:03

## 2019-04-21 RX ADMIN — SODIUM CHLORIDE, PRESERVATIVE FREE 3 ML: 5 INJECTION INTRAVENOUS at 09:52

## 2019-04-21 RX ADMIN — ONDANSETRON 4 MG: 4 TABLET, FILM COATED ORAL at 05:03

## 2019-04-21 RX ADMIN — HYDROXYCHLOROQUINE SULFATE 200 MG: 200 TABLET, FILM COATED ORAL at 08:52

## 2019-04-21 RX ADMIN — HYDROXYCHLOROQUINE SULFATE 200 MG: 200 TABLET, FILM COATED ORAL at 21:17

## 2019-04-21 RX ADMIN — AMLODIPINE BESYLATE 10 MG: 10 TABLET ORAL at 08:52

## 2019-04-21 RX ADMIN — SODIUM CHLORIDE, PRESERVATIVE FREE 3 ML: 5 INJECTION INTRAVENOUS at 21:17

## 2019-04-22 PROCEDURE — 97110 THERAPEUTIC EXERCISES: CPT

## 2019-04-22 PROCEDURE — 99232 SBSQ HOSP IP/OBS MODERATE 35: CPT | Performed by: INTERNAL MEDICINE

## 2019-04-22 RX ORDER — SENNA AND DOCUSATE SODIUM 50; 8.6 MG/1; MG/1
2 TABLET, FILM COATED ORAL 2 TIMES DAILY
Status: DISCONTINUED | OUTPATIENT
Start: 2019-04-22 | End: 2019-04-27 | Stop reason: HOSPADM

## 2019-04-22 RX ORDER — UREA 10 %
3 LOTION (ML) TOPICAL NIGHTLY PRN
Status: DISCONTINUED | OUTPATIENT
Start: 2019-04-22 | End: 2019-04-23

## 2019-04-22 RX ADMIN — SODIUM CHLORIDE, PRESERVATIVE FREE 3 ML: 5 INJECTION INTRAVENOUS at 22:47

## 2019-04-22 RX ADMIN — HYDROXYCHLOROQUINE SULFATE 200 MG: 200 TABLET, FILM COATED ORAL at 09:04

## 2019-04-22 RX ADMIN — AMLODIPINE BESYLATE 10 MG: 10 TABLET ORAL at 09:04

## 2019-04-22 RX ADMIN — OXYCODONE HYDROCHLORIDE AND ACETAMINOPHEN 1 TABLET: 10; 325 TABLET ORAL at 02:53

## 2019-04-22 RX ADMIN — POLYETHYLENE GLYCOL 3350 17 G: 17 POWDER, FOR SOLUTION ORAL at 20:14

## 2019-04-22 RX ADMIN — SENNOSIDES AND DOCUSATE SODIUM 2 TABLET: 8.6; 5 TABLET ORAL at 20:14

## 2019-04-22 RX ADMIN — SODIUM CHLORIDE, PRESERVATIVE FREE 3 ML: 5 INJECTION INTRAVENOUS at 09:05

## 2019-04-22 RX ADMIN — POLYETHYLENE GLYCOL 3350 17 G: 17 POWDER, FOR SOLUTION ORAL at 09:02

## 2019-04-22 RX ADMIN — HYDROXYCHLOROQUINE SULFATE 200 MG: 200 TABLET, FILM COATED ORAL at 20:14

## 2019-04-22 RX ADMIN — OXYCODONE HYDROCHLORIDE AND ACETAMINOPHEN 1 TABLET: 10; 325 TABLET ORAL at 15:26

## 2019-04-22 RX ADMIN — OXYCODONE HYDROCHLORIDE AND ACETAMINOPHEN 1 TABLET: 10; 325 TABLET ORAL at 20:14

## 2019-04-22 RX ADMIN — SENNOSIDES AND DOCUSATE SODIUM 2 TABLET: 8.6; 5 TABLET ORAL at 13:05

## 2019-04-23 PROCEDURE — 97110 THERAPEUTIC EXERCISES: CPT

## 2019-04-23 PROCEDURE — 99232 SBSQ HOSP IP/OBS MODERATE 35: CPT | Performed by: INTERNAL MEDICINE

## 2019-04-23 RX ORDER — CHOLECALCIFEROL (VITAMIN D3) 125 MCG
5 CAPSULE ORAL NIGHTLY PRN
Status: DISCONTINUED | OUTPATIENT
Start: 2019-04-23 | End: 2019-04-27 | Stop reason: HOSPADM

## 2019-04-23 RX ADMIN — HYDROXYCHLOROQUINE SULFATE 200 MG: 200 TABLET, FILM COATED ORAL at 21:57

## 2019-04-23 RX ADMIN — Medication 5 MG: at 21:56

## 2019-04-23 RX ADMIN — AMLODIPINE BESYLATE 10 MG: 10 TABLET ORAL at 08:30

## 2019-04-23 RX ADMIN — HYDROXYCHLOROQUINE SULFATE 200 MG: 200 TABLET, FILM COATED ORAL at 08:30

## 2019-04-23 RX ADMIN — Medication 3 MG: at 00:06

## 2019-04-23 RX ADMIN — POLYETHYLENE GLYCOL 3350 17 G: 17 POWDER, FOR SOLUTION ORAL at 08:30

## 2019-04-23 RX ADMIN — SODIUM CHLORIDE, PRESERVATIVE FREE 3 ML: 5 INJECTION INTRAVENOUS at 08:34

## 2019-04-23 RX ADMIN — SODIUM CHLORIDE, PRESERVATIVE FREE 3 ML: 5 INJECTION INTRAVENOUS at 21:58

## 2019-04-23 RX ADMIN — OXYCODONE HYDROCHLORIDE AND ACETAMINOPHEN 1 TABLET: 10; 325 TABLET ORAL at 17:29

## 2019-04-23 RX ADMIN — SENNOSIDES AND DOCUSATE SODIUM 2 TABLET: 8.6; 5 TABLET ORAL at 08:30

## 2019-04-23 RX ADMIN — OXYCODONE HYDROCHLORIDE AND ACETAMINOPHEN 1 TABLET: 10; 325 TABLET ORAL at 00:08

## 2019-04-23 RX ADMIN — OXYCODONE HYDROCHLORIDE AND ACETAMINOPHEN 1 TABLET: 10; 325 TABLET ORAL at 22:04

## 2019-04-23 RX ADMIN — OXYCODONE HYDROCHLORIDE AND ACETAMINOPHEN 1 TABLET: 10; 325 TABLET ORAL at 04:58

## 2019-04-24 PROCEDURE — 99232 SBSQ HOSP IP/OBS MODERATE 35: CPT | Performed by: INTERNAL MEDICINE

## 2019-04-24 PROCEDURE — 97110 THERAPEUTIC EXERCISES: CPT

## 2019-04-24 RX ADMIN — OXYCODONE HYDROCHLORIDE AND ACETAMINOPHEN 1 TABLET: 10; 325 TABLET ORAL at 03:58

## 2019-04-24 RX ADMIN — HYDROXYCHLOROQUINE SULFATE 200 MG: 200 TABLET, FILM COATED ORAL at 09:40

## 2019-04-24 RX ADMIN — HYDROXYCHLOROQUINE SULFATE 200 MG: 200 TABLET, FILM COATED ORAL at 20:48

## 2019-04-24 RX ADMIN — OXYCODONE HYDROCHLORIDE AND ACETAMINOPHEN 1 TABLET: 10; 325 TABLET ORAL at 18:53

## 2019-04-24 RX ADMIN — AMLODIPINE BESYLATE 10 MG: 10 TABLET ORAL at 09:40

## 2019-04-24 RX ADMIN — SODIUM CHLORIDE, PRESERVATIVE FREE 3 ML: 5 INJECTION INTRAVENOUS at 20:49

## 2019-04-24 RX ADMIN — SODIUM CHLORIDE, PRESERVATIVE FREE 3 ML: 5 INJECTION INTRAVENOUS at 09:50

## 2019-04-24 RX ADMIN — OXYCODONE HYDROCHLORIDE AND ACETAMINOPHEN 1 TABLET: 10; 325 TABLET ORAL at 12:46

## 2019-04-25 PROCEDURE — 97110 THERAPEUTIC EXERCISES: CPT

## 2019-04-25 PROCEDURE — 97535 SELF CARE MNGMENT TRAINING: CPT

## 2019-04-25 RX ADMIN — Medication 5 MG: at 23:54

## 2019-04-25 RX ADMIN — ONDANSETRON 4 MG: 4 TABLET, FILM COATED ORAL at 17:59

## 2019-04-25 RX ADMIN — OXYCODONE HYDROCHLORIDE AND ACETAMINOPHEN 1 TABLET: 10; 325 TABLET ORAL at 16:25

## 2019-04-25 RX ADMIN — HYDROXYCHLOROQUINE SULFATE 200 MG: 200 TABLET, FILM COATED ORAL at 08:33

## 2019-04-25 RX ADMIN — OXYCODONE HYDROCHLORIDE AND ACETAMINOPHEN 1 TABLET: 10; 325 TABLET ORAL at 06:23

## 2019-04-25 RX ADMIN — SODIUM CHLORIDE, PRESERVATIVE FREE 3 ML: 5 INJECTION INTRAVENOUS at 08:37

## 2019-04-25 RX ADMIN — AMLODIPINE BESYLATE 10 MG: 10 TABLET ORAL at 08:32

## 2019-04-25 RX ADMIN — OXYCODONE HYDROCHLORIDE AND ACETAMINOPHEN 1 TABLET: 10; 325 TABLET ORAL at 00:16

## 2019-04-25 RX ADMIN — OXYCODONE HYDROCHLORIDE AND ACETAMINOPHEN 1 TABLET: 10; 325 TABLET ORAL at 23:54

## 2019-04-25 RX ADMIN — Medication 5 MG: at 00:16

## 2019-04-25 RX ADMIN — SODIUM CHLORIDE, PRESERVATIVE FREE 3 ML: 5 INJECTION INTRAVENOUS at 20:47

## 2019-04-25 RX ADMIN — HYDROXYCHLOROQUINE SULFATE 200 MG: 200 TABLET, FILM COATED ORAL at 20:48

## 2019-04-26 VITALS
RESPIRATION RATE: 16 BRPM | WEIGHT: 308.4 LBS | SYSTOLIC BLOOD PRESSURE: 118 MMHG | TEMPERATURE: 99 F | HEART RATE: 96 BPM | DIASTOLIC BLOOD PRESSURE: 75 MMHG | BODY MASS INDEX: 44.15 KG/M2 | HEIGHT: 70 IN | OXYGEN SATURATION: 95 %

## 2019-04-26 RX ORDER — OXYCODONE AND ACETAMINOPHEN 10; 325 MG/1; MG/1
1 TABLET ORAL EVERY 4 HOURS PRN
Qty: 20 TABLET | Refills: 0 | Status: SHIPPED | OUTPATIENT
Start: 2019-04-26 | End: 2019-04-28

## 2019-04-26 RX ORDER — SENNA AND DOCUSATE SODIUM 50; 8.6 MG/1; MG/1
2 TABLET, FILM COATED ORAL 2 TIMES DAILY
Start: 2019-04-26 | End: 2019-06-03

## 2019-04-26 RX ORDER — CHOLECALCIFEROL (VITAMIN D3) 125 MCG
5 CAPSULE ORAL NIGHTLY PRN
Start: 2019-04-26 | End: 2019-11-12

## 2019-04-26 RX ADMIN — SODIUM CHLORIDE, PRESERVATIVE FREE 3 ML: 5 INJECTION INTRAVENOUS at 10:18

## 2019-04-26 RX ADMIN — Medication 5 MG: at 21:56

## 2019-04-26 RX ADMIN — OXYCODONE HYDROCHLORIDE AND ACETAMINOPHEN 1 TABLET: 10; 325 TABLET ORAL at 21:53

## 2019-04-26 RX ADMIN — HYDROXYCHLOROQUINE SULFATE 200 MG: 200 TABLET, FILM COATED ORAL at 10:17

## 2019-04-26 RX ADMIN — OXYCODONE HYDROCHLORIDE AND ACETAMINOPHEN 1 TABLET: 10; 325 TABLET ORAL at 14:41

## 2019-04-26 RX ADMIN — HYDROXYCHLOROQUINE SULFATE 200 MG: 200 TABLET, FILM COATED ORAL at 21:55

## 2019-04-26 RX ADMIN — OXYCODONE HYDROCHLORIDE AND ACETAMINOPHEN 1 TABLET: 10; 325 TABLET ORAL at 05:21

## 2019-04-26 RX ADMIN — AMLODIPINE BESYLATE 10 MG: 10 TABLET ORAL at 10:18

## 2019-05-17 ENCOUNTER — TELEPHONE (OUTPATIENT)
Dept: GASTROENTEROLOGY | Facility: CLINIC | Age: 49
End: 2019-05-17

## 2019-05-17 NOTE — TELEPHONE ENCOUNTER
----- Message from Daniele Marrero sent at 5/17/2019 10:03 AM EDT -----  Regarding: pt called about a mri   Contact: [phone]  ..

## 2019-05-17 NOTE — TELEPHONE ENCOUNTER
Called pt and pt reports that he was in the ER for severe back pain  Numbness from the waist down and they saw a liver mass and wanted him to get a f/u mri and to see Dr Kearney thinks. He is asking what does Dr Kearney want him to do.  Pt does report that he is doing better but is still having some numbness and difficulty walking. Pt is currently at [facility].  Advised Dr Kearney is out of office until Tues.  Pt states that this is fine and does not mind waiting.  Advised will send message to her . Pt verb understanding.

## 2019-05-21 NOTE — TELEPHONE ENCOUNTER
Called pt and advised that Dr Kearney wants him to f/u before mri is scheduled. Pt verb understanding and made appt for 05/28 at 330p.

## 2019-05-22 ENCOUNTER — TRANSCRIBE ORDERS (OUTPATIENT)
Dept: ADMINISTRATIVE | Facility: HOSPITAL | Age: 49
End: 2019-05-22

## 2019-05-22 DIAGNOSIS — M62.81 MUSCLE WEAKNESS: Primary | ICD-10-CM

## 2019-05-23 ENCOUNTER — TELEPHONE (OUTPATIENT)
Dept: GASTROENTEROLOGY | Facility: CLINIC | Age: 49
End: 2019-05-23

## 2019-05-23 NOTE — TELEPHONE ENCOUNTER
----- Message from Daniele Marrero sent at 5/23/2019  8:14 AM EDT -----  Regarding: send order   Pt is at [facility] & is asking to send his office appt over so he can have transportation here & back       Fax#[phone]

## 2019-05-28 ENCOUNTER — OFFICE VISIT (OUTPATIENT)
Dept: GASTROENTEROLOGY | Facility: CLINIC | Age: 49
End: 2019-05-28

## 2019-05-28 VITALS
SYSTOLIC BLOOD PRESSURE: 160 MMHG | DIASTOLIC BLOOD PRESSURE: 94 MMHG | HEIGHT: 70 IN | TEMPERATURE: 98.5 F | WEIGHT: 311.4 LBS | BODY MASS INDEX: 44.58 KG/M2

## 2019-05-28 DIAGNOSIS — R93.2 ABNORMAL CT OF LIVER: ICD-10-CM

## 2019-05-28 DIAGNOSIS — K74.4 SECONDARY BILIARY CIRRHOSIS (HCC): ICD-10-CM

## 2019-05-28 DIAGNOSIS — B18.2 CHRONIC HEPATITIS C WITHOUT HEPATIC COMA (HCC): Primary | ICD-10-CM

## 2019-05-28 PROCEDURE — 99214 OFFICE O/P EST MOD 30 MIN: CPT | Performed by: INTERNAL MEDICINE

## 2019-05-28 RX ORDER — HYDROCODONE BITARTRATE AND ACETAMINOPHEN 10; 325 MG/1; MG/1
1 TABLET ORAL EVERY 6 HOURS PRN
COMMUNITY
End: 2019-06-03

## 2019-05-28 NOTE — PROGRESS NOTES
Chief Complaint   Patient presents with   • Hepatic Disease     Subjective     HPI  Marlon Longoria is a 49 y.o. male who presents for follow-up.  I initially saw him at the end of 2016 and early 2017 for hepatitis C treatment.  He was adherent to the medication but poorly adherent office follow-up and lab visits.  He did clear the infection.  He was most recently hospitalized in April for back pain.  CT imaging incidentally found a cirrhotic appearance of liver as well as a liver nodule.  AFP checked and normal.  He was to have an MRI for further evaluation of this during his hospital stay but was unable to due to pain issues.    He has been in rehab.  His back is better.  He was able to have a spinal MRI this morning.      His viral load during the hospital stay was checked and was normal    He denies any right upper quadrant pain.  He is eating too much.  Unfortunately since being in rehab, he is resumed smoking    He says he leaves rehab tomorrow.      Past Medical History:   Diagnosis Date   • Back pain    • Hep C w/o coma, chronic (CMS/HCC)    • Hypertension        Social History     Socioeconomic History   • Marital status: Single     Spouse name: Not on file   • Number of children: Not on file   • Years of education: Not on file   • Highest education level: Not on file   Occupational History   • Occupation: Mental health associate   Tobacco Use   • Smoking status: Heavy Tobacco Smoker     Packs/day: 1.00     Years: 6.00     Pack years: 6.00     Types: Cigars     Start date: 2010   • Smokeless tobacco: Never Used   • Tobacco comment: Pt declined medication at this time.   Substance and Sexual Activity   • Alcohol use: No   • Sexual activity: Defer   Social History Narrative    Single, children ×2         Current Outpatient Medications:   •  cadexomer iodine (IODOSORB) 0.9 % gel, Apply  topically to the appropriate area as directed Daily As Needed for Wound Care., Disp: , Rfl:   •  HYDROcodone-acetaminophen  (NORCO)  MG per tablet, Take 1 tablet by mouth Every 6 (Six) Hours As Needed for Moderate Pain ., Disp: , Rfl:   •  hydroxychloroquine (PLAQUENIL) 200 MG tablet, Take 200 mg by mouth 2 (Two) Times a Day., Disp: , Rfl:   •  melatonin 5 MG tablet tablet, Take 1 tablet by mouth At Night As Needed (sleep)., Disp: , Rfl:   •  mupirocin (BACTROBAN) 2 % ointment, Apply  topically to the appropriate area as directed 3 (Three) Times a Day., Disp: , Rfl:   •  naproxen (NAPROSYN) 500 MG tablet, Take 1 tablet by mouth 2 (Two) Times a Day With Meals., Disp: 30 tablet, Rfl: 0  •  ondansetron (ZOFRAN) 4 MG tablet, Take 1 tablet by mouth Every 8 (Eight) Hours As Needed for Nausea or Vomiting., Disp: , Rfl:   •  polyethylene glycol (MIRALAX) pack packet, Take 17 g by mouth 2 (Two) Times a Day., Disp: , Rfl:   •  sennosides-docusate sodium (SENOKOT-S) 8.6-50 MG tablet, Take 2 tablets by mouth 2 (Two) Times a Day., Disp: , Rfl:   •  simethicone (MYLICON) 80 MG chewable tablet, Chew 1 tablet 4 (Four) Times a Day As Needed for Flatulence., Disp: , Rfl:   •  triamcinolone (KENALOG) 0.1 % cream, Apply 1 application topically to the appropriate area as directed Daily As Needed., Disp: , Rfl:     Review of Systems   Constitutional: Negative for activity change, appetite change, chills and fever.   HENT: Negative for trouble swallowing.    Respiratory: Negative.    Cardiovascular: Negative.  Negative for chest pain.   Gastrointestinal: Negative for abdominal distention, abdominal pain, anal bleeding, constipation, diarrhea, nausea and vomiting.   Genitourinary: Negative for dysuria, frequency and hematuria.   Musculoskeletal: Positive for back pain.        Difficulty ambulating   Neurological: Positive for numbness.        Leg numbness       Objective   Vitals:    05/28/19 1516   BP: 160/94   Temp: 98.5 °F (36.9 °C)         05/28/19 1516   Weight: (!) 141 kg (311 lb 6.4 oz)     Body mass index is 44.68 kg/m².      Physical Exam    Constitutional: He is oriented to person, place, and time. He appears well-developed and well-nourished. No distress.   obese   HENT:   Head: Normocephalic and atraumatic.   Right Ear: External ear normal.   Left Ear: External ear normal.   Nose: Nose normal.   Mouth/Throat: Oropharynx is clear and moist.   Eyes: Conjunctivae and EOM are normal. Right eye exhibits no discharge. Left eye exhibits no discharge. No scleral icterus.   Neck: Normal range of motion. Neck supple. No thyromegaly present.   No supraclavicular adenopathy   Cardiovascular: Normal rate, regular rhythm, normal heart sounds and intact distal pulses. Exam reveals no gallop.   No murmur heard.  No lower extremity edema   Pulmonary/Chest: Effort normal and breath sounds normal. No respiratory distress. He has no wheezes.   Abdominal: Soft. Normal appearance and bowel sounds are normal. He exhibits no distension and no mass. There is no hepatosplenomegaly. There is no tenderness. There is no rigidity, no rebound and no guarding. No hernia.   Genitourinary:   Genitourinary Comments: Rectal exam deferred   Musculoskeletal: Normal range of motion. He exhibits no edema or tenderness.   No atrophy of upper or lower extremities.  Normal digits and nails of both hands.    In wheelchair today   Lymphadenopathy:     He has no cervical adenopathy.   Neurological: He is alert and oriented to person, place, and time. He displays no atrophy. Coordination normal.   Skin: Skin is warm and dry. No rash noted. He is not diaphoretic. No erythema.   Psychiatric: He has a normal mood and affect. His behavior is normal. Judgment and thought content normal.   Vitals reviewed.      WBC   Date Value Ref Range Status   04/21/2019 9.38 3.40 - 10.80 10*3/mm3 Final     RBC   Date Value Ref Range Status   04/21/2019 5.33 4.14 - 5.80 10*6/mm3 Final     Hemoglobin   Date Value Ref Range Status   04/21/2019 14.3 13.0 - 17.7 g/dL Final     Hematocrit   Date Value Ref Range Status    04/21/2019 45.4 37.5 - 51.0 % Final     MCV   Date Value Ref Range Status   04/21/2019 85.2 79.0 - 97.0 fL Final     MCH   Date Value Ref Range Status   04/21/2019 26.8 26.6 - 33.0 pg Final     MCHC   Date Value Ref Range Status   04/21/2019 31.5 31.5 - 35.7 g/dL Final     RDW   Date Value Ref Range Status   04/21/2019 15.0 12.3 - 15.4 % Final     RDW-SD   Date Value Ref Range Status   04/21/2019 46.5 37.0 - 54.0 fl Final     MPV   Date Value Ref Range Status   04/21/2019 9.6 6.0 - 12.0 fL Final     Platelets   Date Value Ref Range Status   04/21/2019 219 140 - 450 10*3/mm3 Final     Neutrophil %   Date Value Ref Range Status   04/19/2019 64.5 42.7 - 76.0 % Final     Lymphocyte %   Date Value Ref Range Status   04/19/2019 24.3 19.6 - 45.3 % Final     Monocyte %   Date Value Ref Range Status   04/19/2019 8.8 5.0 - 12.0 % Final     Eosinophil %   Date Value Ref Range Status   04/19/2019 1.2 0.3 - 6.2 % Final     Basophil %   Date Value Ref Range Status   04/19/2019 0.6 0.0 - 1.5 % Final     Immature Grans %   Date Value Ref Range Status   04/19/2019 0.6 (H) 0.0 - 0.5 % Final     Neutrophils, Absolute   Date Value Ref Range Status   04/19/2019 5.69 1.40 - 7.00 10*3/mm3 Final     Lymphocytes, Absolute   Date Value Ref Range Status   04/19/2019 2.14 0.70 - 3.10 10*3/mm3 Final     Monocytes, Absolute   Date Value Ref Range Status   04/19/2019 0.78 0.10 - 0.90 10*3/mm3 Final     Eosinophils, Absolute   Date Value Ref Range Status   04/19/2019 0.11 0.00 - 0.40 10*3/mm3 Final     Basophils, Absolute   Date Value Ref Range Status   04/19/2019 0.05 0.00 - 0.20 10*3/mm3 Final     Immature Grans, Absolute   Date Value Ref Range Status   04/19/2019 0.05 0.00 - 0.05 10*3/mm3 Final     nRBC   Date Value Ref Range Status   04/19/2019 0.1 (H) 0.0 - 0.0 /100 WBC Final       Lab Results   Component Value Date    GLUCOSE 110 (H) 04/21/2019    BUN 9 04/21/2019    CREATININE 0.57 (L) 04/21/2019    EGFRIFNONA 115 01/26/2017    EGFRIFAFRI  >150 04/21/2019    BCR 15.8 04/21/2019    CO2 20.7 (L) 04/21/2019    CALCIUM 9.3 04/21/2019    PROTENTOTREF 8.3 01/26/2017    ALBUMIN 3.30 (L) 04/19/2019    LABIL2 0.8 01/26/2017    AST 28 04/19/2019    ALT 23 04/19/2019         IMPRESSION:        1. Indeterminate hepatic mass, possibility of malignant neoplasm not  excluded, further evaluation recommended.  2. Cirrhotic change of the liver. Hepatosplenomegaly.  3. Nonobstructive nephrolithiasis. Left renal cyst.  4. No acute inflammatory process of bowel identified, follow up as  indications persist.     Discussed by telephone with Dr. Seaman for Lesa Shin at 2010,  04/11/2019.      Assessment/Plan    Abnormal CT of liver: liver nodule plus cirrhotic appearance.  Normal AFP    Chronic hep C: cleared s/p harvoni    Cirrhosis: as per imaging though nl platelets, slightly high INR.  Suspect due to obesity, hx of HCV    Plan  MRI abdomen to further quantify lesion, assess cirrhosis    Efforts at weight loss    Avoid hepato-toxins    Hopefully he is better at follow up this time around as he will need ongoing follow up for cirrhosis      Marlon was seen today for hepatic disease.    Diagnoses and all orders for this visit:    Chronic hepatitis C without hepatic coma (CMS/HCC)    Abnormal CT of liver  -     MRI Abdomen With Contrast; Future    Secondary biliary cirrhosis (CMS/HCC)        Dictated utilizing Dragon dictation

## 2019-05-28 NOTE — PATIENT INSTRUCTIONS
Schedule the mri of the liver    For any additional questions, concerns or changes to your condition after today's office visit please contact the office at [phone].

## 2019-05-29 ENCOUNTER — TELEPHONE (OUTPATIENT)
Dept: INTERNAL MEDICINE | Age: 49
End: 2019-05-29

## 2019-05-29 NOTE — TELEPHONE ENCOUNTER
Lucie with [facility] needs PT orders. Patient being discharged today.    Pls advise,    Lucie# [phone]

## 2019-06-03 ENCOUNTER — OFFICE VISIT (OUTPATIENT)
Dept: INTERNAL MEDICINE | Age: 49
End: 2019-06-03

## 2019-06-03 VITALS
DIASTOLIC BLOOD PRESSURE: 80 MMHG | HEIGHT: 70 IN | SYSTOLIC BLOOD PRESSURE: 118 MMHG | TEMPERATURE: 97.8 F | OXYGEN SATURATION: 99 % | BODY MASS INDEX: 45.1 KG/M2 | HEART RATE: 89 BPM | WEIGHT: 315 LBS

## 2019-06-03 DIAGNOSIS — I10 ESSENTIAL HYPERTENSION: ICD-10-CM

## 2019-06-03 DIAGNOSIS — R29.898 WEAKNESS OF BOTH LOWER EXTREMITIES: ICD-10-CM

## 2019-06-03 DIAGNOSIS — E87.1 LOW SODIUM LEVELS: ICD-10-CM

## 2019-06-03 DIAGNOSIS — E66.01 CLASS 3 SEVERE OBESITY WITH SERIOUS COMORBIDITY AND BODY MASS INDEX (BMI) OF 45.0 TO 49.9 IN ADULT, UNSPECIFIED OBESITY TYPE (HCC): ICD-10-CM

## 2019-06-03 DIAGNOSIS — L93.0 LUPUS ERYTHEMATOSUS, UNSPECIFIED FORM: ICD-10-CM

## 2019-06-03 DIAGNOSIS — F41.8 ANXIETY ABOUT HEALTH: ICD-10-CM

## 2019-06-03 DIAGNOSIS — Z09 HOSPITAL DISCHARGE FOLLOW-UP: Primary | ICD-10-CM

## 2019-06-03 LAB
BUN SERPL-MCNC: 7 MG/DL (ref 6–20)
BUN/CREAT SERPL: 11.9 (ref 7–25)
CALCIUM SERPL-MCNC: 9.9 MG/DL (ref 8.6–10.5)
CHLORIDE SERPL-SCNC: 101 MMOL/L (ref 98–107)
CO2 SERPL-SCNC: 30.3 MMOL/L (ref 22–29)
CREAT SERPL-MCNC: 0.59 MG/DL (ref 0.76–1.27)
GLUCOSE SERPL-MCNC: 104 MG/DL (ref 65–99)
POTASSIUM SERPL-SCNC: 4.2 MMOL/L (ref 3.5–5.2)
SODIUM SERPL-SCNC: 141 MMOL/L (ref 136–145)

## 2019-06-03 PROCEDURE — 99214 OFFICE O/P EST MOD 30 MIN: CPT | Performed by: NURSE PRACTITIONER

## 2019-06-03 RX ORDER — HYDROCODONE BITARTRATE AND ACETAMINOPHEN 7.5; 325 MG/1; MG/1
1 TABLET ORAL EVERY 8 HOURS PRN
Refills: 0 | COMMUNITY
Start: 2019-05-31

## 2019-06-03 RX ORDER — HYDROXYCHLOROQUINE SULFATE 200 MG/1
TABLET, FILM COATED ORAL
COMMUNITY
Start: 2019-05-29

## 2019-06-03 RX ORDER — TRIAMCINOLONE ACETONIDE 1 MG/G
1 CREAM TOPICAL
COMMUNITY
End: 2019-06-03 | Stop reason: SDUPTHER

## 2019-06-03 RX ORDER — SENNA AND DOCUSATE SODIUM 50; 8.6 MG/1; MG/1
2 TABLET, FILM COATED ORAL
COMMUNITY
Start: 2019-04-26

## 2019-06-03 RX ORDER — CHOLECALCIFEROL (VITAMIN D3) 125 MCG
5 CAPSULE ORAL
COMMUNITY
Start: 2019-04-26 | End: 2019-06-03

## 2019-06-03 RX ORDER — NAPROXEN 500 MG/1
TABLET ORAL
COMMUNITY
Start: 2019-05-29

## 2019-06-03 RX ORDER — TRAZODONE HYDROCHLORIDE 50 MG/1
25 TABLET ORAL
Refills: 0 | COMMUNITY
Start: 2019-05-29

## 2019-06-03 RX ORDER — CHOLECALCIFEROL (VITAMIN D3) 25 MCG
1000 CAPSULE ORAL DAILY
Refills: 0 | COMMUNITY
Start: 2019-05-29

## 2019-06-03 RX ORDER — ONDANSETRON 4 MG/1
4 TABLET, FILM COATED ORAL
COMMUNITY
Start: 2019-04-20

## 2019-06-03 RX ORDER — TRIAMCINOLONE ACETONIDE 1 MG/G
1 CREAM TOPICAL 3 TIMES DAILY PRN
Qty: 45 G | Refills: 3 | Status: SHIPPED | OUTPATIENT
Start: 2019-06-03 | End: 2020-01-01 | Stop reason: SDUPTHER

## 2019-06-03 RX ORDER — SIMETHICONE 80 MG
80 TABLET,CHEWABLE ORAL
COMMUNITY
Start: 2019-04-20

## 2019-06-03 RX ORDER — POLYETHYLENE GLYCOL 3350 17 G/17G
17 POWDER, FOR SOLUTION ORAL
COMMUNITY
Start: 2019-04-26

## 2019-06-03 RX ORDER — HYDROXYZINE PAMOATE 25 MG/1
25 CAPSULE ORAL 3 TIMES DAILY PRN
Qty: 25 CAPSULE | Refills: 0 | Status: SHIPPED | OUTPATIENT
Start: 2019-06-03 | End: 2019-11-12 | Stop reason: SDUPTHER

## 2019-06-03 RX ORDER — AMLODIPINE BESYLATE 10 MG/1
10 TABLET ORAL DAILY
Refills: 0 | COMMUNITY
Start: 2019-05-29 | End: 2019-10-22 | Stop reason: SDUPTHER

## 2019-06-03 NOTE — PROGRESS NOTES
"Marlon Longoria / 49 y.o. / male  Encounter Date: 06/03/2019    VITALS    Visit Vitals  /80   Pulse 89   Temp 97.8 °F (36.6 °C) (Temporal)   Ht 177.8 cm (70\")   Wt (!) 147 kg (323 lb)   SpO2 99%   BMI 46.35 kg/m²       BP Readings from Last 3 Encounters:   06/03/19 118/80   05/28/19 160/94   04/26/19 118/75     Wt Readings from Last 3 Encounters:   06/03/19 (!) 147 kg (323 lb)   05/28/19 (!) 141 kg (311 lb 6.4 oz)   04/15/19 (!) 140 kg (308 lb 6.4 oz)      Body mass index is 46.35 kg/m².    CC:  Main reason(s) for today's visit: Establish Care; Hypertension; Lupus; and Numbness      HPI:     Date of emergency department encounter: 4/15/19 - 4/26/19  Emergency department: [facility]  Principle Dx: weakness of ANNA lower extremities  Secondary Dx: Liver Mass, HTN, Hepatitis C  History prior to ED visit: He presented to the ED on April 15 with a few days of bilateral lower extremity weakness and falling.  He states his weakness is coming from his back, where he felt he had a \"tweak \"in his mid to lumbar back.  The back pain location varied during ED visit.  He had previously been seen a few days prior, on April 11, in the ED with right abdominal pain and evidence of cirrhosis on CT including a hepatic mass.  He was brought to the ER by a friend on the 15th for the bilateral lower extremity weakness, without fecal or urinary incontinence, and no known trauma prior to onset of symptoms.  Evaluation/Treatment: He was evaluated by neurology, with imaging studies of the spine that were negative.  Neurology ordered MRI of C-spine and T-spine but patient refused.  Improvement with physical therapy in hospital.  Patient was arranged to go to [facility] for rehab, where he has been for approximately 1 month.  He states he has been improving with physical therapy, and is able to stand with assistance of a walker at home, but today he is in a wheelchair due to the walking required of the hospital " visit.  He was seen by GI when he was in the hospital for the hepatitis C and liver mass, MRCP was ordered but patient refused.  No viral load detected, he has a history of Harvoni treatment.  He plans to follow-up with GI outpatient for liver cirrhosis and mass.  Course: He has been living at and completing physical therapy at [facility] from April 26 until today (approximately 1 month). Reports that every time he sits down he has ANNA LE numbness. Some relief with standing.  He reports the bilateral lower extremity weakness feels heavy, but he does not completely lose sensation and bilateral pedal pulses are intact.  Bilateral ankle and lower extremity strength is 5+ with active range of motion against resistance.  He does report swelling in both lower legs, nonpitting.  He is obese, this is not new.  He saw spine surgeon Paola Mcclendon last week and it has been determined that his LE weakness is not from a spine issue. She has provided him with referrals for Vascular Surgery and Neurology for further evaluation and assessment of LE weakness.     Patient Care Team:  Aliyah Flores APRN as PCP - General (Nurse Practitioner)  Andriy Posey MD as PCP - Family Medicine  ____________________________________________________________________    ASSESSMENT & PLAN:    1. Lupus erythematosus, unspecified form  - triamcinolone (KENALOG) 0.1 % cream; Apply 1 application topically to the appropriate area as directed 3 (Three) Times a Day As Needed for Irritation or Rash.  Dispense: 45 g; Refill: 3    2. Weakness of both lower extremities  - Basic metabolic panel    3. Low sodium levels  - Basic metabolic panel    4. Anxiety about health  - hydrOXYzine pamoate (VISTARIL) 25 MG capsule; Take 1 capsule by mouth 3 (Three) Times a Day As Needed for Itching.  Dispense: 25 capsule; Refill: 0    Orders Placed This Encounter   Procedures   • Basic metabolic panel     New Medications Ordered This Visit   Medications   •  triamcinolone (KENALOG) 0.1 % cream     Sig: Apply 1 application topically to the appropriate area as directed 3 (Three) Times a Day As Needed for Irritation or Rash.     Dispense:  45 g     Refill:  3   • hydrOXYzine pamoate (VISTARIL) 25 MG capsule     Sig: Take 1 capsule by mouth 3 (Three) Times a Day As Needed for Itching.     Dispense:  25 capsule     Refill:  0       Summary/Discussion:  1.  Continue current medications as prescribed.    2. Advised patient to start Vistaril 25 mg capsule 3 times a day as needed for anxiety about his health, and continue to do physical therapy exercises and mobility at home as frequently as capable to improve bilateral lower extremity strength.    3. Advised patient to continue with vascular and neurology appointments for further evaluation and treatment.  4. Recheck BMP for previous low-sodium in the hospital.  5.  Recommend patient follow-up with me in approximately 4 months to checkup on chronic issues including lupus, hypertension, lower extremity weakness.  6.  Patient states his prior PCP advised he consider a cardiac stress test in the future, we will reconsider this after bilateral leg weakness has improved or resolved.  Patient was advised to work on weight loss and decrease calorie intake particularly while he is having leg weakness that impedes his mobility.       Return in about 4 months (around 10/3/2019) for Check Up on chronic issues.    Future Appointments   Date Time Provider Department Center   6/13/2019  3:45 PM [facility] 3  [facility]   9/25/2019 11:30 AM Aliyah Flores APRN [facility] None   9/25/2019  1:00 PM Mimi Kearney MD [facility] None     ____________________________________________________________________    REVIEW OF SYSTEMS    Review of Systems   Constitutional: Negative.    Respiratory: Negative.    Cardiovascular: Negative.    Musculoskeletal:        ANNA LE weakness   Skin: Negative.    Neurological: Negative.      PHYSICAL  EXAMINATION    Physical Exam   Constitutional: He is oriented to person, place, and time. He appears well-developed.   Pulmonary/Chest: Effort normal.   Musculoskeletal: Normal range of motion. He exhibits edema (ANNA LE). He exhibits no tenderness.   Neurological: He is alert and oriented to person, place, and time.   Skin: Skin is warm and dry.   Psychiatric: He has a normal mood and affect.   Vitals reviewed.    REVIEWED DATA:    Labs:   No visits with results within 14 Day(s) from this visit.   Latest known visit with results is:   Admission on 04/15/2019, Discharged on 04/26/2019   No results displayed because visit has over 200 results.            Imaging:   No results found.     Medical Tests:     Summary of old records / correspondence / consultant report:   DC summary re: issues addressed on HPI    Request outside records:       ALLERGIES  No Known Allergies     MEDICATIONS  Current Outpatient Medications on File Prior to Visit   Medication Sig   • hydroxychloroquine (PLAQUENIL) 200 MG tablet    • melatonin 5 MG tablet tablet Take 1 tablet by mouth At Night As Needed (sleep).   • naproxen (NAPROSYN) 500 MG tablet    • ondansetron (ZOFRAN) 4 MG tablet Take 4 mg by mouth.   • polyethylene glycol (MIRALAX) packet Take 17 g by mouth.   • sennosides-docusate sodium (SENOKOT-S) 8.6-50 MG tablet Take 2 tablets by mouth.   • simethicone (MYLICON) 80 MG chewable tablet Chew 80 mg.   • [DISCONTINUED] cadexomer iodine (IODOSORB) 0.9 % gel Apply  topically to the appropriate area as directed Daily As Needed for Wound Care.   • [DISCONTINUED] HYDROcodone-acetaminophen (NORCO)  MG per tablet Take 1 tablet by mouth Every 6 (Six) Hours As Needed for Moderate Pain .   • [DISCONTINUED] hydroxychloroquine (PLAQUENIL) 200 MG tablet Take 200 mg by mouth 2 (Two) Times a Day.   • [DISCONTINUED] melatonin 5 MG tablet tablet Take 5 mg by mouth.   • [DISCONTINUED] mupirocin (BACTROBAN) 2 % ointment Apply  topically to the  appropriate area as directed 3 (Three) Times a Day.   • [DISCONTINUED] naproxen (NAPROSYN) 500 MG tablet Take 1 tablet by mouth 2 (Two) Times a Day With Meals.   • [DISCONTINUED] ondansetron (ZOFRAN) 4 MG tablet Take 1 tablet by mouth Every 8 (Eight) Hours As Needed for Nausea or Vomiting.   • [DISCONTINUED] polyethylene glycol (MIRALAX) pack packet Take 17 g by mouth 2 (Two) Times a Day.   • [DISCONTINUED] sennosides-docusate sodium (SENOKOT-S) 8.6-50 MG tablet Take 2 tablets by mouth 2 (Two) Times a Day.   • [DISCONTINUED] simethicone (MYLICON) 80 MG chewable tablet Chew 1 tablet 4 (Four) Times a Day As Needed for Flatulence.   • [DISCONTINUED] triamcinolone (KENALOG) 0.1 % cream Apply 1 application topically to the appropriate area as directed Daily As Needed.   • amLODIPine (NORVASC) 5 MG tablet Take 5 mg by mouth Daily.   • cadexomer iodine (IODOSORB) 0.9 % gel Apply  topically to the appropriate area as directed.   • [employer] D3 1000 units capsule Take 1,000 Units by mouth Daily.   • HYDROcodone-acetaminophen (NORCO) 7.5-325 MG per tablet Take 1 tablet by mouth Every 8 (Eight) Hours As Needed.   • mupirocin (BACTROBAN) 2 % ointment Apply  topically to the appropriate area as directed.   • traZODone (DESYREL) 50 MG tablet Take 25 mg by mouth every night at bedtime.   • [DISCONTINUED] triamcinolone (KENALOG) 0.1 % cream Apply 1 application topically to the appropriate area as directed.     No current facility-administered medications on file prior to visit.        PFSH:     The following portions of the patient's history were reviewed and updated as appropriate: Allergies / Current Medications / Past Medical History / Surgical History / Social History / Family History    PROBLEM LIST   Patient Active Problem List   Diagnosis   • Hypertension   • Hep C w/o coma, chronic (CMS/HCC)   • Encounter for immunization   • Dermatitis   • Back pain   • Abnormal transaminases   • Obesity   • Erectile dysfunction   • Weakness of  both lower extremities   • Liver mass   • HTN (hypertension)   • Hepatitis C   • Secondary biliary cirrhosis (CMS/HCC)   • Abnormal CT of liver       PAST MEDICAL HISTORY  Past Medical History:   Diagnosis Date   • Back pain    • Hep C w/o coma, chronic (CMS/HCC)    • Hypertension        SURGICAL HISTORY  Past Surgical History:   Procedure Laterality Date   • WISDOM TOOTH EXTRACTION         SOCIAL HISTORY  Social History     Socioeconomic History   • Marital status: Single     Spouse name: Not on file   • Number of children: Not on file   • Years of education: Not on file   • Highest education level: Not on file   Occupational History   • Occupation: Mental health associate   Tobacco Use   • Smoking status: Heavy Tobacco Smoker     Packs/day: 1.00     Years: 6.00     Pack years: 6.00     Types: Cigars     Start date: 2010   • Smokeless tobacco: Never Used   • Tobacco comment: Pt declined medication at this time.   Substance and Sexual Activity   • Alcohol use: No   • Sexual activity: Defer   Social History Narrative    Single, children ×2       FAMILY HISTORY  Family History   Problem Relation Age of Onset   • Stroke Mother    • Heart disease Mother    • Heart disease Father

## 2019-06-07 ENCOUNTER — TELEPHONE (OUTPATIENT)
Dept: INTERNAL MEDICINE | Age: 49
End: 2019-06-07

## 2019-06-07 NOTE — TELEPHONE ENCOUNTER
Can we call these groups and get him scheduled? This was supposed to be from his appointment with Paola Mcclendon of [facility]. His referrals are for Neurology and Vascular Surgery. May need to call Paola's office to determine if they have placed the referrals since it was through [facility]. Thanks RH

## 2019-06-10 DIAGNOSIS — F41.8 ANXIETY ABOUT HEALTH: Primary | ICD-10-CM

## 2019-06-10 RX ORDER — BUSPIRONE HYDROCHLORIDE 7.5 MG/1
7.5 TABLET ORAL 2 TIMES DAILY
Qty: 60 TABLET | Refills: 0 | Status: SHIPPED | OUTPATIENT
Start: 2019-06-10 | End: 2019-07-10 | Stop reason: SDUPTHER

## 2019-06-10 NOTE — TELEPHONE ENCOUNTER
hydrOXYzine pamoate (VISTARIL) 25 MG capsule is on back order, can we order pt another RX for anxiety.

## 2019-06-10 NOTE — TELEPHONE ENCOUNTER
Contact patient.     Can start Buspirone 7.5mg BID for anxiety, but needs to be taken on a scheduled basis to be effective. Recommend follow up with Aliyah as scheduled.   Will send RX to pharmacy.

## 2019-06-13 ENCOUNTER — APPOINTMENT (OUTPATIENT)
Dept: MRI IMAGING | Facility: HOSPITAL | Age: 49
End: 2019-06-13

## 2019-06-13 ENCOUNTER — TELEPHONE (OUTPATIENT)
Dept: GASTROENTEROLOGY | Facility: CLINIC | Age: 49
End: 2019-06-13

## 2019-06-13 DIAGNOSIS — R93.2 ABNORMAL CT OF LIVER: Primary | ICD-10-CM

## 2019-06-13 NOTE — TELEPHONE ENCOUNTER
Call received from Joan with [facility].  Pt for MRI today - needs to be ordered with and without contrast.      Order placed - message to Dr Kearney.

## 2019-06-29 ENCOUNTER — HOSPITAL ENCOUNTER (OUTPATIENT)
Dept: MRI IMAGING | Facility: HOSPITAL | Age: 49
Discharge: HOME | End: 2019-06-29
Admitting: INTERNAL MEDICINE

## 2019-06-29 DIAGNOSIS — R93.2 ABNORMAL CT OF LIVER: ICD-10-CM

## 2019-06-29 PROCEDURE — 82565 ASSAY OF CREATININE: CPT

## 2019-06-29 PROCEDURE — A9577 INJ MULTIHANCE: HCPCS | Performed by: INTERNAL MEDICINE

## 2019-06-29 PROCEDURE — 0 GADOBENATE DIMEGLUMINE 529 MG/ML SOLUTION: Performed by: INTERNAL MEDICINE

## 2019-06-29 PROCEDURE — 74183 MRI ABD W/O CNTR FLWD CNTR: CPT

## 2019-06-29 RX ADMIN — GADOBENATE DIMEGLUMINE 20 ML: 529 INJECTION, SOLUTION INTRAVENOUS at 15:20

## 2019-07-01 LAB — CREAT BLDA-MCNC: 1.2 MG/DL (ref 0.6–1.3)

## 2019-07-03 ENCOUNTER — TELEPHONE (OUTPATIENT)
Dept: INTERNAL MEDICINE | Age: 49
End: 2019-07-03

## 2019-07-09 ENCOUNTER — TELEPHONE (OUTPATIENT)
Dept: GASTROENTEROLOGY | Facility: CLINIC | Age: 49
End: 2019-07-09

## 2019-07-09 NOTE — TELEPHONE ENCOUNTER
----- Message from Mimi Kearney MD sent at 7/5/2019 12:24 PM EDT -----  MRI shows the liver lesion to be a hemangioma which is a benign liver growth.  He does have evidence of a cirrhotic liver    He needs to maintain follow-up with me as scheduled in September to follow his cirrhosis

## 2019-07-09 NOTE — TELEPHONE ENCOUNTER
Call from pt.  Advise per Dr Kearney that MRI shows liver lesion to be a hemangioma, which is a benign liver growth.  Does have evidence of cirrhotic liver.    Needs to maintain f/u with DR Kearney as scheduled in Sept to follow cirrhosis.  Verb understanding.

## 2019-07-10 DIAGNOSIS — F41.8 ANXIETY ABOUT HEALTH: ICD-10-CM

## 2019-07-10 RX ORDER — BUSPIRONE HYDROCHLORIDE 7.5 MG/1
TABLET ORAL
Qty: 60 TABLET | Refills: 0 | Status: SHIPPED | OUTPATIENT
Start: 2019-07-10

## 2019-07-12 DIAGNOSIS — L93.0 LUPUS ERYTHEMATOSUS, UNSPECIFIED FORM: ICD-10-CM

## 2019-07-12 DIAGNOSIS — R29.898 WEAKNESS OF BOTH LOWER EXTREMITIES: Primary | ICD-10-CM

## 2019-07-18 ENCOUNTER — OFFICE VISIT (OUTPATIENT)
Dept: INTERNAL MEDICINE | Age: 49
End: 2019-07-18

## 2019-07-18 VITALS
WEIGHT: 315 LBS | HEIGHT: 70 IN | DIASTOLIC BLOOD PRESSURE: 90 MMHG | TEMPERATURE: 97.8 F | HEART RATE: 85 BPM | SYSTOLIC BLOOD PRESSURE: 160 MMHG | BODY MASS INDEX: 45.1 KG/M2 | OXYGEN SATURATION: 94 %

## 2019-07-18 DIAGNOSIS — R29.898 WEAKNESS OF BOTH LOWER EXTREMITIES: ICD-10-CM

## 2019-07-18 DIAGNOSIS — M79.605 BILATERAL LOWER EXTREMITY PAIN: ICD-10-CM

## 2019-07-18 DIAGNOSIS — Z02.89 ENCOUNTER TO OBTAIN EXCUSE FROM WORK: Primary | ICD-10-CM

## 2019-07-18 DIAGNOSIS — M79.604 BILATERAL LOWER EXTREMITY PAIN: ICD-10-CM

## 2019-07-18 PROCEDURE — 99214 OFFICE O/P EST MOD 30 MIN: CPT | Performed by: NURSE PRACTITIONER

## 2019-07-18 NOTE — PROGRESS NOTES
"Marlon Longoria / 49 y.o. / male  Encounter Date: 07/18/2019    ASSESSMENT & PLAN:    Problem List Items Addressed This Visit        Nervous and Auditory    Weakness of both lower extremities    Relevant Orders    Ambulatory Referral to Vascular Surgery      Other Visit Diagnoses     Encounter to obtain excuse from work    -  Primary    Bilateral lower extremity pain        Relevant Orders    Ambulatory Referral to Vascular Surgery        Orders Placed This Encounter   Procedures   • Ambulatory Referral to Vascular Surgery     No orders of the defined types were placed in this encounter.      Summary/Discussion:  1. Patient has been evaluated by spine specialist on multiple encounters regarding Kenneth LE weakness and pain. Imaging performed does not support a diagnosis, as far as I can see on imaging reports and specialist reports. The patient claims he completed an EMG study this morning per Dr. Mcclendon, which was \"negative\". It was mentioned previously that if spine imaging was negative, then he will require vascular consult for BLE weakness and pain. He is requesting this referral be placed today by me. I have provided the patient with his requested vascular surgery consult and evaluation referral for BLE weakness and pain, with onset approximately April 2019, and poor improvement in symptoms.   2. The patient has been provided with a copy of his office note from me when I first met him on 6/3/19. At that visit, he informed me that he was undergoing care and evaluation with a spine specialist for BLE weakness evaluation. He was in rehab at [facility] prior to being evaluated by me for the first time. He states that his original leave of absence documentation was completed by [facility] attending provider [facility]. I have instructed him to request his medical records from [facility] from the time of his admission in April and the records from [facility] during his 1 month of rehab spent there. " "He has verbalized understanding with this plan.   3. I have advised him to continue his medications as prescribed, and to attend all scheduled follow up visits with his specialty providers.    Return if symptoms worsen or fail to improve, for Next scheduled follow up.  ________________________________________________________________    VITALS:    Visit Vitals  /90   Pulse 85   Temp 97.8 °F (36.6 °C) (Temporal)   Ht 177.8 cm (70\")   Wt (!) 150 kg (331 lb)   SpO2 94%   BMI 47.49 kg/m²       BP Readings from Last 3 Encounters:   07/18/19 160/90   06/03/19 118/80   05/28/19 160/94     Wt Readings from Last 3 Encounters:   07/18/19 (!) 150 kg (331 lb)   06/29/19 (!) 147 kg (324 lb 1.2 oz)   06/03/19 (!) 147 kg (323 lb)      Body mass index is 47.49 kg/m².    CC: Main reason(s) for today's visit: Extremity Weakness (fmla)      HPI    Patient is a 49 y.o. male who is here to discuss disability paperwork from his employer.     Patient Care Team:  Aliyah Flores APRN as PCP - General (Nurse Practitioner)  ____________________________________________________________________    REVIEW OF SYSTEMS    Review of Systems   Respiratory: Negative.    Cardiovascular: Negative.    Musculoskeletal: Positive for gait problem (walks with walker, slow unsteady gait, weak legs).   Neurological: Positive for weakness (weakness and intermittent pain ANNA LE with sitting/standing ) and numbness (occasional, legs).       PHYSICAL EXAMINATION    Physical Exam   Constitutional: He is oriented to person, place, and time. He appears well-developed. No distress.   Cardiovascular: Normal rate, regular rhythm and normal heart sounds.   Pulmonary/Chest: Effort normal and breath sounds normal.   Musculoskeletal: He exhibits no edema or tenderness.   Exhibits slow, unsteady gait, with weakness in BLE.   Neurological: He is alert and oriented to person, place, and time. He displays normal reflexes. A sensory deficit (reports dec sensation on legs) is " present. He exhibits normal muscle tone. Coordination (unsteady gait and balance, walks with walker) abnormal.   Skin: Skin is warm and dry.   Psychiatric: He has a normal mood and affect.   Vitals reviewed.    REVIEWED DATA:    Labs:   Lab Results   Component Value Date     06/03/2019    K 4.2 06/03/2019    AST 28 04/19/2019    ALT 23 04/19/2019    BUN 7 06/03/2019    CREATININE 1.20 06/29/2019    CREATININE 0.59 (L) 06/03/2019    CREATININE 0.57 (L) 04/21/2019    EGFRIFNONA 145 06/03/2019    EGFRIFAFRI >150 06/03/2019       Lab Results   Component Value Date    GLUCOSE 110 (H) 04/21/2019    GLUCOSE 109 (H) 04/20/2019    GLUCOSE 101 (H) 04/19/2019    HGBA1C 6.14 (H) 04/17/2019       Lab Results   Component Value Date    LDL 82 08/30/2016    HDL 53 08/30/2016    TRIG 78 08/30/2016       Lab Results   Component Value Date    TSH 0.875 04/16/2019          Lab Results   Component Value Date    WBC 9.38 04/21/2019    HGB 14.3 04/21/2019    HGB 14.4 04/20/2019    HGB 15.1 04/19/2019     04/21/2019         Imaging:      Medical Tests:      Summary of old records / correspondence / consultant report:      Request outside records:   ______________________________________________________________________    ALLERGIES  No Known Allergies     MEDICATIONS  Current Outpatient Medications on File Prior to Visit   Medication Sig   • amLODIPine (NORVASC) 5 MG tablet Take 5 mg by mouth Daily.   • busPIRone (BUSPAR) 7.5 MG tablet TAKE 1 TABLET BY MOUTH TWICE A DAY   • cadexomer iodine (IODOSORB) 0.9 % gel Apply  topically to the appropriate area as directed.   • CVS D3 1000 units capsule Take 1,000 Units by mouth Daily.   • HYDROcodone-acetaminophen (NORCO) 7.5-325 MG per tablet Take 1 tablet by mouth Every 8 (Eight) Hours As Needed.   • hydroxychloroquine (PLAQUENIL) 200 MG tablet    • hydrOXYzine pamoate (VISTARIL) 25 MG capsule Take 1 capsule by mouth 3 (Three) Times a Day As Needed for Itching.   • melatonin 5 MG  tablet tablet Take 1 tablet by mouth At Night As Needed (sleep).   • mupirocin (BACTROBAN) 2 % ointment Apply  topically to the appropriate area as directed.   • naproxen (NAPROSYN) 500 MG tablet    • ondansetron (ZOFRAN) 4 MG tablet Take 4 mg by mouth.   • polyethylene glycol (MIRALAX) packet Take 17 g by mouth.   • sennosides-docusate sodium (SENOKOT-S) 8.6-50 MG tablet Take 2 tablets by mouth.   • simethicone (MYLICON) 80 MG chewable tablet Chew 80 mg.   • traZODone (DESYREL) 50 MG tablet Take 25 mg by mouth every night at bedtime.   • triamcinolone (KENALOG) 0.1 % cream Apply 1 application topically to the appropriate area as directed 3 (Three) Times a Day As Needed for Irritation or Rash.     No current facility-administered medications on file prior to visit.        PFSH:     The following portions of the patient's history were reviewed and updated as appropriate: Allergies / Current Medications / Past Medical History / Surgical History / Social History / Family History    PROBLEM LIST   Patient Active Problem List   Diagnosis   • Hypertension   • Hep C w/o coma, chronic (CMS/HCC)   • Encounter for immunization   • Dermatitis   • Back pain   • Abnormal transaminases   • Obesity   • Erectile dysfunction   • Weakness of both lower extremities   • Liver mass   • HTN (hypertension)   • Hepatitis C   • Secondary biliary cirrhosis (CMS/HCC)   • Abnormal CT of liver       PAST MEDICAL HISTORY  Past Medical History:   Diagnosis Date   • Back pain    • Hep C w/o coma, chronic (CMS/HCC)    • Hypertension        SURGICAL HISTORY  Past Surgical History:   Procedure Laterality Date   • WISDOM TOOTH EXTRACTION         SOCIAL HISTORY  Social History     Socioeconomic History   • Marital status: Single     Spouse name: Not on file   • Number of children: Not on file   • Years of education: Not on file   • Highest education level: Not on file   Occupational History   • Occupation: Mental health associate   Tobacco Use   •  Smoking status: Heavy Tobacco Smoker     Packs/day: 1.00     Years: 6.00     Pack years: 6.00     Types: Cigars     Start date: 2010   • Smokeless tobacco: Never Used   • Tobacco comment: Pt declined medication at this time.   Substance and Sexual Activity   • Alcohol use: No   • Sexual activity: Defer   Social History Narrative    Single, children ×2       FAMILY HISTORY  Family History   Problem Relation Age of Onset   • Stroke Mother    • Heart disease Mother    • Heart disease Father

## 2019-07-22 ENCOUNTER — HOSPITAL ENCOUNTER (OUTPATIENT)
Dept: PHYSICAL THERAPY | Facility: HOSPITAL | Age: 49
Setting detail: THERAPIES SERIES
Discharge: HOME | End: 2019-07-22

## 2019-07-22 DIAGNOSIS — M54.40 CHRONIC BILATERAL LOW BACK PAIN WITH SCIATICA, SCIATICA LATERALITY UNSPECIFIED: ICD-10-CM

## 2019-07-22 DIAGNOSIS — G89.29 CHRONIC BILATERAL LOW BACK PAIN WITH SCIATICA, SCIATICA LATERALITY UNSPECIFIED: ICD-10-CM

## 2019-07-22 DIAGNOSIS — R26.9 GAIT ABNORMALITY: ICD-10-CM

## 2019-07-22 DIAGNOSIS — R29.898 WEAKNESS OF BOTH LOWER EXTREMITIES: Primary | ICD-10-CM

## 2019-07-22 PROCEDURE — 97162 PT EVAL MOD COMPLEX 30 MIN: CPT | Performed by: PHYSICAL THERAPIST

## 2019-07-22 PROCEDURE — 97110 THERAPEUTIC EXERCISES: CPT | Performed by: PHYSICAL THERAPIST

## 2019-07-22 NOTE — THERAPY EVALUATION
"    Outpatient Physical Therapy Ortho Initial Evaluation  [facility]     Patient Name: Marlon Longoria  : 1970  MRN: [id number]  Today's Date: 2019      Visit Date: 2019    Patient Active Problem List   Diagnosis   • Hypertension   • Hep C w/o coma, chronic (CMS/HCC)   • Encounter for immunization   • Dermatitis   • Back pain   • Abnormal transaminases   • Obesity   • Erectile dysfunction   • Weakness of both lower extremities   • Liver mass   • HTN (hypertension)   • Hepatitis C   • Secondary biliary cirrhosis (CMS/HCC)   • Abnormal CT of liver        Past Medical History:   Diagnosis Date   • Back pain    • Hep C w/o coma, chronic (CMS/HCC)    • Hypertension         Past Surgical History:   Procedure Laterality Date   • WISDOM TOOTH EXTRACTION         Visit Dx:     ICD-10-CM ICD-9-CM   1. Weakness of both lower extremities R29.898 729.89   2. Chronic bilateral low back pain with sciatica, sciatica laterality unspecified M54.40 724.2    G89.29 724.3     338.29   3. Gait abnormality R26.9 781.2         Patient History     Row Name 19 0800             History    Chief Complaint  Difficulty Walking;Difficulty with daily activities;Pain  -GR      Type of Pain  Back pain;Lower Extremity / Leg  -GR      Date Current Problem(s) Began  19  -GR      Brief Description of Current Complaint  C/o pain starting April while on 3rd shift at the [employer]; states he could feel something \"coming on\" due to previous episodes of back pain.  Pain progressively worsened over the next 3 days (took off work) and finally went to ED.  Had imaging and was issued pain medication and sent home.  He states within 36 hours he went back to ED by EMS and was admitted. States he had numbness from the waist down and could not walk.  He states his mobility slowly started returning, he was able to being walking with a RW.  He was d/c'd to SNF.  He stayed there for 30 days and then d/c to his friend's home; his plan is to " return to his own home.  He is ambulating with a RW for the community and without an AD at times in his home.  He is doing a modified home program right now but nothing formal.  He admits to 1 fall around the time of symptom onset. Had a HH eval after d/c from SNF but no follow ups; was told he was doing too well and did not need it, per patient.  He is eager to return home to his own bed for comfort reasons.  He c/o intermittent spasms and mostly constant numbness in the L thigh to toes; R side varies but will travel to the toes as well. He has muscle relaxers but does not find relief.  He doesn't try ice or heat but will elevate his legs.  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  He is currently on short-term disability.  He would like to be more confident with stair climbing specifically descent.  -GR      Patient/Caregiver Goals  Relieve pain;Return to prior level of function;Return to work stairs  -GR      Occupation/sports/leisure activities  mental health tech at the [employer]  -GR      What clinical tests have you had for this problem?  MRI  -GR      Results of Clinical Tests  see epic  -GR         Pain     Pain Location  Back;Leg  -GR      Pain at Present  0  -GR      Pain at Best  0  -GR      Pain at Worst  8  -GR      Is your sleep disturbed?  Yes  -GR      Is medication used to assist with sleep?  Yes muscle relaxer; doesn't help  -GR         Fall Risk Assessment    Any falls in the past year:  Yes  -GR      Number of falls reported in the last 12 months  1  -GR      Factors that contributed to the fall:  Fatigue;Other (comment) numbness  -GR         Services    Prior Rehab/Home Health Experiences  Yes  -GR      When was the prior experience with Rehab/Home Health  Had 1 HH eval and then was d/c'd  -GR      Where was the prior experience with Rehab/Home Health  home  -GR      Are you currently receiving Home Health services  No  -GR         Daily Activities    Primary Language   English  -GR      How does patient learn best?  Listening;Reading  -GR      Pt Participated in POC and Goals  Yes  -GR         Safety    Are you being hurt, hit, or frightened by anyone at home or in your life?  No  -GR      Are you being neglected by a caregiver  No  -GR        User Key  (r) = Recorded By, (t) = Taken By, (c) = Cosigned By    Initials Name Provider Type    Yvan Barfield, PT Physical Therapist          PT Ortho     Row Name 07/22/19 0800       Posture/Observations    Alignment Options  Lumbar lordosis  -GR    Lumbar lordosis  Severe  -GR       Quarter Clearing    Quarter Clearing  Lower Quarter Clearing  -GR       DTR- Lower Quarter Clearing    Patellar tendon (L2-4)  Bilateral:;3- Slightly hyperactive response  -GR    Achilles tendon (S1-2)  Bilateral:;2- Normal response  -GR       Neural Tension Signs- Lower Quarter Clearing    Slump  Bilateral:;Negative  -GR    SLR  Bilateral:;Negative  -GR       Myotomal Screen- Lower Quarter Clearing    Hip flexion (L2)  Bilateral:;4 (Good)  -GR    Knee extension (L3)  Bilateral:;4 (Good)  -GR    Ankle DF (L4)  Bilateral:;5 (Normal)  -GR    Great toe extension (L5)  Right:;5 (Normal);Left:;4 (Good)  -GR    Knee flexion (S2)  Bilateral:;4 (Good)  -GR       Lumbar ROM Screen- Lower Quarter Clearing    Lumbar Flexion  Impaired 100% with compensated knee flexion  -GR    Lumbar Extension  Impaired 75% with compensated lateral flexion R to L  -GR    Lumbar Lateral Flexion  Impaired proximal tibia with compensated trunk flexion B  -GR    Lumbar Rotation  Normal  -GR       MMT (Manual Muscle Testing)    Rt Lower Ext  --  -GR    Lt Lower Ext  --  -GR       Sensation    Sensation WNL?  WNL  -GR    Additional Comments  intermittent paresthesias, intact to light touch  -GR       Balance Skills Training    SLS  1 sec R 1 sec L  -GR    Balance Comments  functional squat intact with excessive knee anteversion noted  -GR       Gait/Stairs Assessment/Training     Assistive Device (Gait)  walker, front-wheeled  -GR    Comment (Gait/Stairs)  crouched stance, shortened stance time LLE, slow elke  -GR      User Key  (r) = Recorded By, (t) = Taken By, (c) = Cosigned By    Initials Name Provider Type    GR Yvan Obregon, PT Physical Therapist                      Therapy Education  Education Details: Role of outpatient PT, POC, initial HEP, expectations, possible DOMS, decompression technique  Given: HEP, Symptoms/condition management  Program: New  How Provided: Verbal, Demonstration, Written  Provided to: Patient  Level of Understanding: Teach back education performed, Verbalized, Demonstrated     PT OP Goals     Row Name 07/22/19 1000          PT Short Term Goals    STG Date to Achieve  08/06/19  -GR     STG 1  Patient will be independent with initial HEP.  -GR     STG 1 Progress  New  -GR     STG 2  Patient will ambulate 100 ft with least resrictive AD and increasing elke.  -GR     STG 2 Progress  New  -GR     STG 3  Patient will deny falls.  -     STG 3 Progress  New  -        Long Term Goals    LTG Date to Achieve  09/05/19  -GR     LTG 1  Patient will be independent with progressive HEP for long term management of current condition.  -GR     LTG 1 Progress  New  -GR     LTG 2  Patient will score </= 40% disability on the modified ADRIENNE to indicate improved perceived performance with ADLs.  -     LTG 2 Progress  New  -GR     LTG 3  Patient will return to reciprocal stair climbing with single rail for safety PRN.  -     LTG 3 Progress  New  -GR     LTG 4  Patient will demonstrate safe lifting/transfer mechanics for long term spine preservation and return to work.  -     LTG 4 Progress  New  -     LTG 5  Patient will ambulate 250 ft without assist for community reintegration.  -     LTG 5 Progress  New  -GR        Time Calculation    PT Goal Re-Cert Due Date  10/20/19  -GR       User Key  (r) = Recorded By, (t) = Taken By, (c) = Cosigned By    Initials  Name Provider Type    GR Yvan Obregon, PT Physical Therapist          PT Assessment/Plan     Row Name 07/22/19 1453          PT Assessment    Functional Limitations  Limitation in home management;Performance in leisure activities;Performance in work activities;Impaired gait;Limitations in community activities;Limitations in functional capacity and performance  -GR     Impairments  Balance;Gait;Muscle strength;Motor function;Pain;Peripheral nerve integrity;Poor body mechanics;Posture;Range of motion;Sensation  -GR     Assessment Comments  49 y.o. male referred to outpatient physical therapy for evaluation and treatment of bilateral weakness; onset April 2019 no specific mechanism.  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 Signs and symptoms are consistent with referring diagnosis.  Pertinent comorbidities and personal factors that may affect progress include, but are not limited to, chronic LBP, HTN, obesity, bout in SNF and HH eval.  This condition is evolving. Recommend skilled PT to address functional deficits. Thank you for this referral.  -GR     Please refer to paper survey for additional self-reported information  Yes  -GR     Rehab Potential  Good  -GR     Patient/caregiver participated in establishment of treatment plan and goals  Yes  -GR     Patient would benefit from skilled therapy intervention  Yes  -GR        PT Plan    PT Frequency  2x/week  -GR     Predicted Duration of Therapy Intervention (Therapy Eval)  6 weeks  -GR     Planned CPT's?  PT EVAL MOD COMPLELITY: 98960;PT RE-EVAL: 00861;PT THER PROC EA 15 MIN: 06762;PT THER ACT EA 15 MIN: 95797;PT MANUAL THERAPY EA 15 MIN: 06934;PT NEUROMUSC RE-EDUCATION EA 15 MIN: 11229;PT GAIT TRAINING EA 15 MIN: 41497;PT SELF CARE/HOME MGMT/TRAIN EA 15: 09763;PT HOT  OR COLD PACK TREAT MCARE;PT ELECTRICAL STIM UNATTEND: ;PT ELECTRICAL STIM ATTD EA 15 MIN: 60010;PT TRACTION LUMBAR: 16783  -GR     Physical Therapy Interventions (Optional Details)  home exercise program;manual therapy techniques;modalities;neuromuscular re-education;patient/family education;postural re-education;ROM (Range of Motion);strengthening;stretching;swiss ball techniques;transfer training  -GR     PT Plan Comments  Begin with Nustep. Review/perform HEP.  Add UE bicep curls/tricep extension, TA march in place, resisted trunk rotation, bridges (when tolerated).  Modalities for pain control PRN.  -GR       User Key  (r) = Recorded By, (t) = Taken By, (c) = Cosigned By    Initials Name Provider Type    Yvan Barfield, PT Physical Therapist            Exercises     Row Name 07/22/19 0900             Total Minutes    37683 - PT Therapeutic Exercise Minutes  8  -GR         Exercise 1    Exercise Name 1  PPT  -GR      Cueing 1  Demo  -GR      Sets 1  1  -GR      Reps 1  5  -GR      Time 1  5 sec  -GR         Exercise 2    Exercise Name 2  LTR  -GR      Cueing 2  Demo  -GR      Sets 2  1  -GR      Reps 2  5  -GR      Time 2  5 sec  -GR         Exercise 3    Exercise Name 3  HL adductor squeeze  -GR      Cueing 3  Demo  -GR      Sets 3  1  -GR      Reps 3  5  -GR      Time 3  5 sec  -GR         Exercise 4    Exercise Name 4  HL clam  -GR      Cueing 4  Demo  -GR      Sets 4  1  -GR      Reps 4  5  -GR      Additional Comments  RTB  -GR         Exercise 5    Exercise Name 5  SAQ  -GR      Cueing 5  Demo  -GR      Sets 5  1  -GR      Reps 5  5  -GR      Time 5  5 sec  -GR        User Key  (r) = Recorded By, (t) = Taken By, (c) = Cosigned By    Initials Name Provider Type    Yvan Barfield, PT Physical Therapist                        Outcome Measure Options: Modifed Owestry  Modified Oswestry  Modified Oswestry Score/Comments: 50% disability      Time Calculation:     Start Time: 0900  Stop Time:  0955  Time Calculation (min): 55 min  Total Timed Code Minutes- PT: 8 minute(s)     Therapy Charges for Today     Code Description Service Date Service Provider Modifiers Qty    89922109772 HC PT THER PROC EA 15 MIN 7/22/2019 Yvan Obregon, PT GP 1    37022484548 HC PT EVAL MOD COMPLEXITY 3 7/22/2019 Yvan Obregon, PT GP 1          PT G-Codes  Outcome Measure Options: Modifed Owestry  Modified Oswestry Score/Comments: 50% disability         Yvan Obregon, PT  7/22/2019

## 2019-07-24 ENCOUNTER — APPOINTMENT (OUTPATIENT)
Dept: PHYSICAL THERAPY | Facility: HOSPITAL | Age: 49
End: 2019-07-24

## 2019-07-25 ENCOUNTER — HOSPITAL ENCOUNTER (OUTPATIENT)
Dept: PHYSICAL THERAPY | Facility: HOSPITAL | Age: 49
Setting detail: THERAPIES SERIES
Discharge: HOME | End: 2019-07-25

## 2019-07-25 ENCOUNTER — TELEPHONE (OUTPATIENT)
Dept: INTERNAL MEDICINE | Age: 49
End: 2019-07-25

## 2019-07-25 DIAGNOSIS — R29.898 WEAKNESS OF BOTH LOWER EXTREMITIES: Primary | ICD-10-CM

## 2019-07-25 DIAGNOSIS — G89.29 CHRONIC BILATERAL LOW BACK PAIN WITH SCIATICA, SCIATICA LATERALITY UNSPECIFIED: ICD-10-CM

## 2019-07-25 DIAGNOSIS — R26.9 GAIT ABNORMALITY: ICD-10-CM

## 2019-07-25 DIAGNOSIS — M54.40 CHRONIC BILATERAL LOW BACK PAIN WITH SCIATICA, SCIATICA LATERALITY UNSPECIFIED: ICD-10-CM

## 2019-07-25 PROCEDURE — 97110 THERAPEUTIC EXERCISES: CPT

## 2019-07-25 NOTE — THERAPY TREATMENT NOTE
Outpatient Physical Therapy Ortho Treatment Note  [facility]     Patient Name: Marlon Longoria  : 1970  MRN: [id number]  Today's Date: 2019      Visit Date: 2019    Visit Dx:    ICD-10-CM ICD-9-CM   1. Weakness of both lower extremities R29.898 729.89   2. Chronic bilateral low back pain with sciatica, sciatica laterality unspecified M54.40 724.2    G89.29 724.3     338.29   3. Gait abnormality R26.9 781.2       Patient Active Problem List   Diagnosis   • Hypertension   • Hep C w/o coma, chronic (CMS/HCC)   • Encounter for immunization   • Dermatitis   • Back pain   • Abnormal transaminases   • Obesity   • Erectile dysfunction   • Weakness of both lower extremities   • Liver mass   • HTN (hypertension)   • Hepatitis C   • Secondary biliary cirrhosis (CMS/HCC)   • Abnormal CT of liver        Past Medical History:   Diagnosis Date   • Back pain    • Hep C w/o coma, chronic (CMS/HCC)    • Hypertension         Past Surgical History:   Procedure Laterality Date   • WISDOM TOOTH EXTRACTION         PT Ortho     Row Name 19 1300       Subjective Comments    Subjective Comments  Pt states he is frustrated because not getting Short term Disability check as no one MD managing his case.  States he wants to go back to work as soon as able to ambulate 1.5 flights of stairs .  Has to do that at work regularly  -SI       Subjective Pain    Able to rate subjective pain?  yes  -SI    Pre-Treatment Pain Level  2  -SI    Post-Treatment Pain Level  2  -SI    Subjective Pain Comment  currently mild LBP but intermittent sharp pain in legs and some numbness.  Dennis of it as bad as it was  -SI       Balance Skills Training    SLS  3-4 seconds on either LE  -SI       Transfers    Sit-Stand Alba (Transfers)  independent x 5 no use of UE  -SI       Gait/Stairs Assessment/Training    Comment (Gait/Stairs)  amb into PT with RWX, slow gait , mild FF posture  -SI      User Key  (r) = Recorded By, (t) = Taken By,  (c) = Cosigned By    Initials Name Provider Type    NANI Renee Goldie SAINI, SHABNAM Physical Therapy Assistant                      PT Assessment/Plan     Row Name 07/25/19 1338          PT Assessment    Assessment Comments  Pt 15 min late for appt then felt like he needed to go to his car to get his belt which he did but never donned????  His goal is to be able to ascend and descend 1.5 flights of stairs  -SI       User Key  (r) = Recorded By, (t) = Taken By, (c) = Cosigned By    Initials Name Provider Type    Corie Scherermaikol SAINI, SHABNAM Physical Therapy Assistant            Exercises     Row Name 07/25/19 1300             Subjective Comments    Subjective Comments  Pt states he is frustrated because not getting Short term Disability check as no one MD managing his case.  States he wants to go back to work as soon as able to ambulate 1.5 flights of stairs .  Has to do that at work regularly  -SI         Subjective Pain    Able to rate subjective pain?  yes  -SI      Pre-Treatment Pain Level  2  -SI      Post-Treatment Pain Level  2  -SI      Subjective Pain Comment  currently mild LBP but intermittent sharp pain in legs and some numbness.  Dennis of it as bad as it was  -SI         Total Minutes    19797 - PT Therapeutic Exercise Minutes  40  -SI         Exercise 6    Exercise Name 6  LAQ 5 lbs  -SI      Sets 6  2  -SI      Reps 6  10  -SI         Exercise 7    Exercise Name 7  sit hip and knee f5 lbslexion  -SI      Sets 7  2  -SI      Reps 7  10  -SI         Exercise 8    Exercise Name 8  4 inch step ups with UE support  -SI      Sets 8  1  -SI      Reps 8  20  -SI         Exercise 9    Exercise Name 9  HR sitting  -SI      Sets 9  1  -SI      Reps 9  20  -SI         Exercise 10    Exercise Name 10  sit to stand to sit from mat  -SI      Reps 10  5  -SI      Additional Comments  no use UE  -SI         Exercise 11    Exercise Name 11  30 second sway test eyes closed  -SI      Additional Comments  moderat sway  -SI        User  Key  (r) = Recorded By, (t) = Taken By, (c) = Cosigned By    Initials Name Provider Type    Goldie Scherer PTA Physical Therapy Assistant                           Therapy Education  Given: HEP(Try to be on time for appointments)  Program: Progressed  How Provided: Verbal, Demonstration, Written  Provided to: Patient  Level of Understanding: Teach back education performed              Time Calculation:   Start Time: 1230  Stop Time: 1310  Time Calculation (min): 40 min  Total Timed Code Minutes- PT: 40 minute(s)  Therapy Charges for Today     Code Description Service Date Service Provider Modifiers Qty    07804963923 HC PT THER PROC EA 15 MIN 7/25/2019 Goldie Renee PTA GP 3                    Goldie Renee PTA  7/25/2019

## 2019-07-25 NOTE — TELEPHONE ENCOUNTER
Pt called and stated joel never got the fax for his short term disablity.    Pls advise,    PT#[phone]

## 2019-07-29 ENCOUNTER — HOSPITAL ENCOUNTER (OUTPATIENT)
Dept: PHYSICAL THERAPY | Facility: HOSPITAL | Age: 49
Setting detail: THERAPIES SERIES
Discharge: HOME | End: 2019-07-29

## 2019-07-29 DIAGNOSIS — R29.898 WEAKNESS OF BOTH LOWER EXTREMITIES: Primary | ICD-10-CM

## 2019-07-29 DIAGNOSIS — G89.29 CHRONIC BILATERAL LOW BACK PAIN WITH SCIATICA, SCIATICA LATERALITY UNSPECIFIED: ICD-10-CM

## 2019-07-29 DIAGNOSIS — R26.9 GAIT ABNORMALITY: ICD-10-CM

## 2019-07-29 DIAGNOSIS — M54.40 CHRONIC BILATERAL LOW BACK PAIN WITH SCIATICA, SCIATICA LATERALITY UNSPECIFIED: ICD-10-CM

## 2019-07-29 PROCEDURE — 97110 THERAPEUTIC EXERCISES: CPT

## 2019-07-29 NOTE — THERAPY TREATMENT NOTE
Outpatient Physical Therapy Ortho Treatment Note  [facility]     Patient Name: Marlon Longoria  : 1970  MRN: [id number]  Today's Date: 2019      Visit Date: 2019    Visit Dx:    ICD-10-CM ICD-9-CM   1. Weakness of both lower extremities R29.898 729.89   2. Chronic bilateral low back pain with sciatica, sciatica laterality unspecified M54.40 724.2    G89.29 724.3     338.29   3. Gait abnormality R26.9 781.2       Patient Active Problem List   Diagnosis   • Hypertension   • Hep C w/o coma, chronic (CMS/HCC)   • Encounter for immunization   • Dermatitis   • Back pain   • Abnormal transaminases   • Obesity   • Erectile dysfunction   • Weakness of both lower extremities   • Liver mass   • HTN (hypertension)   • Hepatitis C   • Secondary biliary cirrhosis (CMS/HCC)   • Abnormal CT of liver        Past Medical History:   Diagnosis Date   • Back pain    • Hep C w/o coma, chronic (CMS/HCC)    • Hypertension         Past Surgical History:   Procedure Laterality Date   • WISDOM TOOTH EXTRACTION                         PT Assessment/Plan     Row Name 19 1208          PT Assessment    Assessment Comments  Patient ambulating with RW. Progressed exercises with no issues  -WS       User Key  (r) = Recorded By, (t) = Taken By, (c) = Cosigned By    Initials Name Provider Type    WS Mo Barrera PTA Physical Therapy Assistant            Exercises     Row Name 19 4914             Subjective Comments    Subjective Comments  worked on stairs want to work on taller stairsover the weekend  -WS         Subjective Pain    Able to rate subjective pain?  yes  -WS      Pre-Treatment Pain Level  2  -WS      Subjective Pain Comment  Patient 30 min late  -WS         Total Minutes    79599 - PT Therapeutic Exercise Minutes  30  -WS         Exercise 1    Exercise Name 1  PPT  -WS      Cueing 1  Demo  -WS      Sets 1  1  -WS      Reps 1  15  -WS      Time 1  5 sec  -WS         Exercise 2    Exercise Name 2   LTR  -WS      Cueing 2  Demo  -WS      Sets 2  1  -WS      Reps 2  10  -WS      Time 2  5 sec  -WS         Exercise 3    Exercise Name 3  HL adductor squeeze  -WS      Cueing 3  Demo  -WS      Sets 3  2  -WS      Reps 3  15  -WS      Time 3  5 sec  -WS         Exercise 4    Exercise Name 4  HL clam  -WS      Cueing 4  Demo  -WS      Sets 4  2  -WS      Reps 4  15  -WS      Additional Comments  RTB  -WS         Exercise 5    Exercise Name 5  SAQ  -WS      Cueing 5  Demo  -WS      Sets 5  1  -WS      Reps 5  15  -WS      Time 5  5 sec  -WS         Exercise 6    Exercise Name 6  LAQ   -WS      Sets 6  2  -WS      Reps 6  10  -WS      Additional Comments  7#  -WS         Exercise 7    Exercise Name 7  sit hip flex  -WS      Sets 7  1  -WS      Reps 7  15  -WS      Additional Comments  --  -WS         Exercise 8    Exercise Name 8  4 inch step ups with UE support  -WS      Sets 8  1  -WS      Reps 8  20  -WS         Exercise 9    Exercise Name 9  HR standing  -WS      Sets 9  1  -WS      Reps 9  20  -WS         Exercise 10    Exercise Name 10  sit to stand to sit f// bars  -WS      Reps 10  10  -WS         Exercise 11    Exercise Name 11  standing HS curl  -WS      Reps 11  10  -WS      Additional Comments  // bars  -WS        User Key  (r) = Recorded By, (t) = Taken By, (c) = Cosigned By    Initials Name Provider Type    Mo Gonsales PTA Physical Therapy Assistant                       PT OP Goals     Row Name 07/29/19 1200          PT Short Term Goals    STG Date to Achieve  08/06/19  -WS     STG 1  Patient will be independent with initial HEP.  -WS     STG 1 Progress  Ongoing  -WS     STG 2  Patient will ambulate 100 ft with least resrictive AD and increasing elke.  -WS     STG 2 Progress  Ongoing  -WS     STG 3  Patient will deny falls.  -WS     STG 3 Progress  Ongoing  -WS        Long Term Goals    LTG Date to Achieve  09/05/19  -WS     LTG 1  Patient will be independent with progressive HEP for long  term management of current condition.  -WS     LTG 1 Progress  New  -WS     LTG 2  Patient will score </= 40% disability on the modified ADRIENNE to indicate improved perceived performance with ADLs.  -WS     LTG 2 Progress  New  -WS     LTG 3  Patient will return to reciprocal stair climbing with single rail for safety PRN.  -WS     LTG 3 Progress  New  -WS     LTG 4  Patient will demonstrate safe lifting/transfer mechanics for long term spine preservation and return to work.  -WS     LTG 4 Progress  New  -WS     LTG 5  Patient will ambulate 250 ft without assist for community reintegration.  -WS     LTG 5 Progress  New  -WS       User Key  (r) = Recorded By, (t) = Taken By, (c) = Cosigned By    Initials Name Provider Type    Mo Gonsales PTA Physical Therapy Assistant          Therapy Education  Given: HEP, Symptoms/condition management, Posture/body mechanics  Program: Reinforced  How Provided: Verbal  Provided to: Patient              Time Calculation:   Start Time: 1145  Stop Time: 1215  Time Calculation (min): 30 min  Therapy Charges for Today     Code Description Service Date Service Provider Modifiers Qty    96210205236 HC PT THER PROC EA 15 MIN 7/29/2019 Mo Barrera PTA GP 2                    Mo Barrera PTA  7/29/2019

## 2019-08-01 ENCOUNTER — HOSPITAL ENCOUNTER (OUTPATIENT)
Dept: PHYSICAL THERAPY | Facility: HOSPITAL | Age: 49
Setting detail: THERAPIES SERIES
Discharge: HOME | End: 2019-08-01

## 2019-08-01 DIAGNOSIS — R29.898 WEAKNESS OF BOTH LOWER EXTREMITIES: Primary | ICD-10-CM

## 2019-08-01 PROCEDURE — 97110 THERAPEUTIC EXERCISES: CPT | Performed by: PHYSICAL THERAPIST

## 2019-08-01 NOTE — THERAPY TREATMENT NOTE
Outpatient Physical Therapy Ortho Treatment Note  [facility]     Patient Name: Marlon Longoria  : 1970  MRN: [id number]  Today's Date: 2019      Visit Date: 2019    Visit Dx:    ICD-10-CM ICD-9-CM   1. Weakness of both lower extremities R29.898 729.89       Patient Active Problem List   Diagnosis   • Hypertension   • Hep C w/o coma, chronic (CMS/HCC)   • Encounter for immunization   • Dermatitis   • Back pain   • Abnormal transaminases   • Obesity   • Erectile dysfunction   • Weakness of both lower extremities   • Liver mass   • HTN (hypertension)   • Hepatitis C   • Secondary biliary cirrhosis (CMS/HCC)   • Abnormal CT of liver        Past Medical History:   Diagnosis Date   • Back pain    • Hep C w/o coma, chronic (CMS/HCC)    • Hypertension         Past Surgical History:   Procedure Laterality Date   • WISDOM TOOTH EXTRACTION                         PT Assessment/Plan     Row Name 19 1218          PT Assessment    Assessment Comments  Patient is a hardworker. Added and advanced with weights for several exercises this visit met with appropriate fatigue. Pain is well controlled (mostly reported when sleeping).  He continues to compensate with forward flexed trunk gait pattern.  -GR        PT Plan    PT Plan Comments  Continue POC>  -GR       User Key  (r) = Recorded By, (t) = Taken By, (c) = Cosigned By    Initials Name Provider Type    Yvan Barfield, PT Physical Therapist            Exercises     Row Name 19 1000             Subjective Comments    Subjective Comments  Less scared on stairs as long as he has rails.  -GR         Subjective Pain    Able to rate subjective pain?  yes  -GR      Pre-Treatment Pain Level  0  -GR         Total Minutes    97304 - PT Therapeutic Exercise Minutes  42  -GR         Exercise 3    Exercise Name 3  HL adductor squeeze  -GR      Cueing 3  Demo  -GR      Sets 3  2  -GR      Reps 3  15  -GR      Time 3  5 sec  -GR      Additional Comments   "ball  -GR         Exercise 4    Exercise Name 4  HL clam  -GR      Cueing 4  Demo  -GR      Sets 4  2  -GR      Reps 4  15  -GR      Additional Comments  GTB  -GR         Exercise 5    Exercise Name 5  SAQ  -GR      Cueing 5  Demo  -GR      Sets 5  1  -GR      Reps 5  15  -GR      Time 5  5 sec  -GR      Additional Comments  7#  -GR         Exercise 6    Exercise Name 6  LAQ   -GR      Sets 6  2  -GR      Reps 6  10  -GR      Additional Comments  7#  -GR         Exercise 7    Exercise Name 7  standing hip flex/march  -GR      Sets 7  2  -GR      Reps 7  10  -GR      Additional Comments  3#  -GR         Exercise 8    Exercise Name 8  8\" step up  -GR      Sets 8  1  -GR      Reps 8  20  -GR      Additional Comments  each side leading  -GR         Exercise 9    Exercise Name 9  HR standing  -GR      Sets 9  1  -GR      Reps 9  20  -GR         Exercise 10    Exercise Name 10  sit to stand to sit f// bars  -GR      Sets 10  1  -GR      Reps 10  15  -GR      Additional Comments  GTB at knees  -GR         Exercise 11    Exercise Name 11  standing HS curl  -GR      Sets 11  2  -GR      Reps 11  10  -GR      Additional Comments  3# in // bars  -GR         Exercise 12    Exercise Name 12  standing hip abd  -GR      Reps 12  2  -GR      Time 12  10  -GR      Additional Comments  3# in // bars  -GR         Exercise 13    Exercise Name 13  Tuff stuff - rows +TA  -GR      Sets 13  2  -GR      Reps 13  10  -GR      Additional Comments  L4  -GR         Exercise 14    Exercise Name 14  Tuff stuff- alternating UE extension + TA  -GR      Sets 14  2  -GR      Reps 14  10  -GR      Additional Comments  L4  -GR        User Key  (r) = Recorded By, (t) = Taken By, (c) = Cosigned By    Initials Name Provider Type    Yvan Barfield, PT Physical Therapist                       PT OP Goals     Row Name 08/01/19 1200          PT Short Term Goals    STG Date to Achieve  08/06/19  -GR     STG 1  Patient will be independent with initial " HEP.  -GR     STG 1 Progress  Ongoing  -GR     STG 2  Patient will ambulate 100 ft with least resrictive AD and increasing elke.  -GR     STG 2 Progress  Ongoing  -GR     STG 3  Patient will deny falls.  -GR     STG 3 Progress  Ongoing  -GR        Long Term Goals    LTG Date to Achieve  09/05/19  -GR     LTG 1  Patient will be independent with progressive HEP for long term management of current condition.  -GR     LTG 1 Progress  New  -GR     LTG 2  Patient will score </= 40% disability on the modified ADRIENNE to indicate improved perceived performance with ADLs.  -GR     LTG 2 Progress  New  -GR     LTG 3  Patient will return to reciprocal stair climbing with single rail for safety PRN.  -GR     LTG 3 Progress  New  -GR     LTG 4  Patient will demonstrate safe lifting/transfer mechanics for long term spine preservation and return to work.  -GR     LTG 4 Progress  New  -GR     LTG 5  Patient will ambulate 250 ft without assist for community reintegration.  -GR     LTG 5 Progress  New  -GR       User Key  (r) = Recorded By, (t) = Taken By, (c) = Cosigned By    Initials Name Provider Type     Yvan Obregon, PT Physical Therapist          Therapy Education  Education Details: rest breaks and expectations              Time Calculation:   Start Time: 1100  Stop Time: 1142  Time Calculation (min): 42 min  Total Timed Code Minutes- PT: 42 minute(s)  Therapy Charges for Today     Code Description Service Date Service Provider Modifiers Qty    01446813749  PT THER PROC EA 15 MIN 8/1/2019 Yvan Obregon, PT GP 3                    Yvan Obregon PT  8/1/2019

## 2019-08-05 ENCOUNTER — HOSPITAL ENCOUNTER (OUTPATIENT)
Dept: PHYSICAL THERAPY | Facility: HOSPITAL | Age: 49
Setting detail: THERAPIES SERIES
Discharge: HOME | End: 2019-08-05

## 2019-08-05 DIAGNOSIS — R29.898 WEAKNESS OF BOTH LOWER EXTREMITIES: Primary | ICD-10-CM

## 2019-08-05 DIAGNOSIS — R26.9 GAIT ABNORMALITY: ICD-10-CM

## 2019-08-05 DIAGNOSIS — M54.40 CHRONIC BILATERAL LOW BACK PAIN WITH SCIATICA, SCIATICA LATERALITY UNSPECIFIED: ICD-10-CM

## 2019-08-05 DIAGNOSIS — G89.29 CHRONIC BILATERAL LOW BACK PAIN WITH SCIATICA, SCIATICA LATERALITY UNSPECIFIED: ICD-10-CM

## 2019-08-05 PROCEDURE — 97110 THERAPEUTIC EXERCISES: CPT

## 2019-08-05 NOTE — THERAPY TREATMENT NOTE
Outpatient Physical Therapy Ortho Treatment Note  [facility]     Patient Name: Marlon Longoria  : 1970  MRN: [id number]  Today's Date: 2019      Visit Date: 2019    Visit Dx:    ICD-10-CM ICD-9-CM   1. Weakness of both lower extremities R29.898 729.89   2. Chronic bilateral low back pain with sciatica, sciatica laterality unspecified M54.40 724.2    G89.29 724.3     338.29   3. Gait abnormality R26.9 781.2       Patient Active Problem List   Diagnosis   • Hypertension   • Hep C w/o coma, chronic (CMS/HCC)   • Encounter for immunization   • Dermatitis   • Back pain   • Abnormal transaminases   • Obesity   • Erectile dysfunction   • Weakness of both lower extremities   • Liver mass   • HTN (hypertension)   • Hepatitis C   • Secondary biliary cirrhosis (CMS/HCC)   • Abnormal CT of liver        Past Medical History:   Diagnosis Date   • Back pain    • Hep C w/o coma, chronic (CMS/HCC)    • Hypertension         Past Surgical History:   Procedure Laterality Date   • WISDOM TOOTH EXTRACTION                         PT Assessment/Plan     Row Name 19 1349          PT Assessment    Assessment Comments  Continue to progress strengthening. Ambulating with Rwforward flexed posture  -WS       User Key  (r) = Recorded By, (t) = Taken By, (c) = Cosigned By    Initials Name Provider Type    Mo Gonsales PTA Physical Therapy Assistant            Exercises     Row Name 19 1325             Subjective Pain    Subjective Pain Comment  Patient 40 min lae for appt  -WS         Total Minutes    32045 - PT Therapeutic Exercise Minutes  30  -WS         Exercise 1    Exercise Name 1  PPT  -WS      Cueing 1  Demo  -WS      Sets 1  1  -WS      Reps 1  15  -WS         Exercise 3    Exercise Name 3  HL adductor squeeze  -WS      Cueing 3  Demo  -WS      Sets 3  2  -WS      Reps 3  15  -WS      Time 3  5 sec  -WS      Additional Comments  ball  -WS         Exercise 4    Exercise Name 4  HL clam  -WS       "Cueing 4  Demo  -WS      Sets 4  2  -WS      Reps 4  15  -WS      Additional Comments  GTB  -WS         Exercise 5    Exercise Name 5  SAQ  -WS      Cueing 5  Demo  -WS      Sets 5  1  -WS      Reps 5  15  -WS      Time 5  5 sec  -WS         Exercise 6    Exercise Name 6  LAQ   -WS      Sets 6  2  -WS      Reps 6  10  -WS      Additional Comments  7#  -WS         Exercise 7    Exercise Name 7  standing hip flex/march  -WS      Sets 7  2  -WS      Reps 7  10  -WS      Additional Comments  4#  -WS         Exercise 8    Exercise Name 8  8\" step up  -WS      Sets 8  1  -WS      Reps 8  20  -WS         Exercise 9    Exercise Name 9  HR standing  -WS      Sets 9  1  -WS      Reps 9  20  -WS         Exercise 10    Exercise Name 10  sit to stand to sit f// bars  -WS      Sets 10  1  -WS      Reps 10  15  -WS      Additional Comments  BTB at knee  -WS         Exercise 11    Exercise Name 11  standing HS curl  -WS      Sets 11  2  -WS      Reps 11  10  -WS      Additional Comments  4#  -WS         Exercise 12    Exercise Name 12  standing hip abd  -WS      Reps 12  2  -WS      Time 12  10  -WS      Additional Comments  4#  -WS         Exercise 13    Exercise Name 13  rows seated BTB  -WS      Sets 13  2  -WS      Reps 13  10  -WS         Exercise 14    Exercise Name 14  Tuff stuff- alternating UE extension + TA  -WS      Sets 14  2  -WS      Reps 14  10  -WS      Additional Comments  L4  -WS        User Key  (r) = Recorded By, (t) = Taken By, (c) = Cosigned By    Initials Name Provider Type    Mo Gonsales PTA Physical Therapy Assistant                       PT OP Goals     Row Name 08/05/19 1300          PT Short Term Goals    STG Date to Achieve  08/06/19  -WS     STG 1  Patient will be independent with initial HEP.  -WS     STG 1 Progress  Ongoing  -WS     STG 2  Patient will ambulate 100 ft with least resrictive AD and increasing elke.  -WS     STG 2 Progress  Ongoing  -WS     STG 3  Patient will deny falls.  " -     STG 3 Progress  Ongoing  -WS        Long Term Goals    LTG Date to Achieve  09/05/19  -WS     LTG 1  Patient will be independent with progressive HEP for long term management of current condition.  -WS     LTG 1 Progress  New  -WS     LTG 2  Patient will score </= 40% disability on the modified ADRIENNE to indicate improved perceived performance with ADLs.  -WS     LTG 2 Progress  New  -WS     LTG 3  Patient will return to reciprocal stair climbing with single rail for safety PRN.  -WS     LTG 3 Progress  New  -WS     LTG 4  Patient will demonstrate safe lifting/transfer mechanics for long term spine preservation and return to work.  -WS     LTG 4 Progress  New  -WS     LTG 5  Patient will ambulate 250 ft without assist for community reintegration.  -     LTG 5 Progress  New  -       User Key  (r) = Recorded By, (t) = Taken By, (c) = Cosigned By    Initials Name Provider Type    Mo Gonsales PTA Physical Therapy Assistant          Therapy Education  Given: HEP, Symptoms/condition management  Program: Reinforced  How Provided: Verbal              Time Calculation:   Start Time: 1325  Stop Time: 1355  Time Calculation (min): 30 min  Therapy Charges for Today     Code Description Service Date Service Provider Modifiers Qty    63241488229 HC PT THER PROC EA 15 MIN 8/5/2019 Mo Barrera PTA GP 2                    Mo Barrera PTA  8/5/2019

## 2019-08-08 ENCOUNTER — APPOINTMENT (OUTPATIENT)
Dept: PHYSICAL THERAPY | Facility: HOSPITAL | Age: 49
End: 2019-08-08

## 2019-08-12 ENCOUNTER — HOSPITAL ENCOUNTER (OUTPATIENT)
Dept: PHYSICAL THERAPY | Facility: HOSPITAL | Age: 49
Setting detail: THERAPIES SERIES
Discharge: HOME | End: 2019-08-12

## 2019-08-12 DIAGNOSIS — M54.40 CHRONIC BILATERAL LOW BACK PAIN WITH SCIATICA, SCIATICA LATERALITY UNSPECIFIED: ICD-10-CM

## 2019-08-12 DIAGNOSIS — G89.29 CHRONIC BILATERAL LOW BACK PAIN WITH SCIATICA, SCIATICA LATERALITY UNSPECIFIED: ICD-10-CM

## 2019-08-12 DIAGNOSIS — R26.9 GAIT ABNORMALITY: ICD-10-CM

## 2019-08-12 DIAGNOSIS — R29.898 WEAKNESS OF BOTH LOWER EXTREMITIES: Primary | ICD-10-CM

## 2019-08-12 PROCEDURE — 97110 THERAPEUTIC EXERCISES: CPT

## 2019-08-12 NOTE — THERAPY TREATMENT NOTE
Outpatient Physical Therapy Ortho Treatment Note  [facility]     Patient Name: Marlon Longoria  : 1970  MRN: [id number]  Today's Date: 2019      Visit Date: 2019    Visit Dx:    ICD-10-CM ICD-9-CM   1. Weakness of both lower extremities R29.898 729.89   2. Chronic bilateral low back pain with sciatica, sciatica laterality unspecified M54.40 724.2    G89.29 724.3     338.29   3. Gait abnormality R26.9 781.2       Patient Active Problem List   Diagnosis   • Hypertension   • Hep C w/o coma, chronic (CMS/HCC)   • Encounter for immunization   • Dermatitis   • Back pain   • Abnormal transaminases   • Obesity   • Erectile dysfunction   • Weakness of both lower extremities   • Liver mass   • HTN (hypertension)   • Hepatitis C   • Secondary biliary cirrhosis (CMS/HCC)   • Abnormal CT of liver        Past Medical History:   Diagnosis Date   • Back pain    • Hep C w/o coma, chronic (CMS/HCC)    • Hypertension         Past Surgical History:   Procedure Laterality Date   • WISDOM TOOTH EXTRACTION                         PT Assessment/Plan     Row Name 19 1400          PT Assessment    Assessment Comments  Ambulating with RW. Continue to progresss strengthening  -WS       User Key  (r) = Recorded By, (t) = Taken By, (c) = Cosigned By    Initials Name Provider Type    Mo Gonsales PTA Physical Therapy Assistant            Exercises     Row Name 19 1330             Subjective Comments    Subjective Comments  walk alot. Legs feel stronger  -WS         Exercise 1    Exercise Name 1  PPT  -WS      Cueing 1  Demo  -WS      Sets 1  1  -WS      Reps 1  15  -WS      Time 1  5 sec  -WS         Exercise 3    Exercise Name 3  HL adductor squeeze  -WS      Cueing 3  Demo  -WS      Sets 3  2  -WS      Reps 3  15  -WS      Time 3  5 sec  -WS      Additional Comments  ball  -WS         Exercise 4    Exercise Name 4  HL clam  -WS      Cueing 4  Demo  -WS      Sets 4  2  -WS      Reps 4  15  -WS       "Additional Comments  BTB  -WS         Exercise 5    Exercise Name 5  SAQ  -WS      Cueing 5  Demo  -WS      Sets 5  1  -WS      Reps 5  15  -WS      Time 5  5 sec  -WS      Additional Comments  10#  -WS         Exercise 6    Exercise Name 6  LAQ   -WS      Sets 6  2  -WS      Reps 6  10  -WS      Additional Comments  10#  -WS         Exercise 7    Exercise Name 7  standing hip flex/march  -WS      Sets 7  2  -WS      Reps 7  10  -WS      Additional Comments  5#  -WS         Exercise 8    Exercise Name 8  8\" step up  -WS      Sets 8  1  -WS      Reps 8  20  -WS         Exercise 9    Exercise Name 9  HR standing  -WS      Sets 9  1  -WS      Reps 9  20  -WS         Exercise 10    Exercise Name 10  sit to stand to sit f// bars  -WS      Sets 10  1  -WS      Reps 10  15  -WS      Additional Comments  BTB  -WS         Exercise 11    Exercise Name 11  standing HS curl  -WS      Sets 11  2  -WS      Reps 11  10  -WS      Additional Comments  5#  -WS         Exercise 12    Exercise Name 12  standing hip abd  -WS      Reps 12  2  -WS      Time 12  10  -WS      Additional Comments  5#  -WS         Exercise 13    Exercise Name 13  Tuff stuff  -WS      Sets 13  2  -WS      Reps 13  10  -WS      Additional Comments  L6  -WS         Exercise 14    Exercise Name 14  Tuff stuff- alternating UE extension + TA  -WS      Sets 14  2  -WS      Reps 14  10  -WS      Additional Comments  L4  -WS         Exercise 15    Exercise Name 15  recprocal stairs  -WS      Reps 15  x 4  -WS        User Key  (r) = Recorded By, (t) = Taken By, (c) = Cosigned By    Initials Name Provider Type    Mo Gonsales PTA Physical Therapy Assistant                       PT OP Goals     Row Name 08/12/19 1400          PT Short Term Goals    STG Date to Achieve  08/06/19  -WS     STG 1  Patient will be independent with initial HEP.  -WS     STG 1 Progress  Ongoing  -WS     STG 2  Patient will ambulate 100 ft with least resrictive AD and increasing " elke.  -WS     STG 2 Progress  Ongoing  -WS     STG 3  Patient will deny falls.  -WS     STG 3 Progress  Ongoing  -WS        Long Term Goals    LTG Date to Achieve  09/05/19  -WS     LTG 1  Patient will be independent with progressive HEP for long term management of current condition.  -WS     LTG 1 Progress  Ongoing  -WS     LTG 2  Patient will score </= 40% disability on the modified ADRIENNE to indicate improved perceived performance with ADLs.  -WS     LTG 2 Progress  Ongoing  -WS     LTG 3  Patient will return to reciprocal stair climbing with single rail for safety PRN.  -WS     LTG 4  Patient will demonstrate safe lifting/transfer mechanics for long term spine preservation and return to work.  -WS     LTG 4 Progress  Ongoing  -WS     LTG 5  Patient will ambulate 250 ft without assist for community reintegration.  -     LTG 5 Progress  New  -       User Key  (r) = Recorded By, (t) = Taken By, (c) = Cosigned By    Initials Name Provider Type    Mo Gonsales PTA Physical Therapy Assistant          Therapy Education  Given: HEP, Symptoms/condition management, Posture/body mechanics  Program: Reinforced  How Provided: Verbal  Provided to: Patient              Time Calculation:   Start Time: 1330  Stop Time: 1400  Time Calculation (min): 30 min  Therapy Charges for Today     Code Description Service Date Service Provider Modifiers Qty    96533006506 HC PT THER PROC EA 15 MIN 8/12/2019 Mo Barrera PTA GP 2                    Mo Barrera PTA  8/12/2019

## 2019-08-19 ENCOUNTER — HOSPITAL ENCOUNTER (OUTPATIENT)
Dept: PHYSICAL THERAPY | Facility: HOSPITAL | Age: 49
Setting detail: THERAPIES SERIES
Discharge: HOME | End: 2019-08-19

## 2019-08-19 DIAGNOSIS — G89.29 CHRONIC BILATERAL LOW BACK PAIN WITH SCIATICA, SCIATICA LATERALITY UNSPECIFIED: ICD-10-CM

## 2019-08-19 DIAGNOSIS — R29.898 WEAKNESS OF BOTH LOWER EXTREMITIES: Primary | ICD-10-CM

## 2019-08-19 DIAGNOSIS — R26.9 GAIT ABNORMALITY: ICD-10-CM

## 2019-08-19 DIAGNOSIS — M54.40 CHRONIC BILATERAL LOW BACK PAIN WITH SCIATICA, SCIATICA LATERALITY UNSPECIFIED: ICD-10-CM

## 2019-08-19 PROCEDURE — 97112 NEUROMUSCULAR REEDUCATION: CPT | Performed by: PHYSICAL THERAPIST

## 2019-08-19 PROCEDURE — 97116 GAIT TRAINING THERAPY: CPT | Performed by: PHYSICAL THERAPIST

## 2019-08-19 NOTE — THERAPY RE-EVALUATION
Outpatient Physical Therapy Ortho Re-Assessment  [facility]     Patient Name: Marlon Longoria  : 1970  MRN: [id number]  Today's Date: 2019      Visit Date: 2019    Patient Active Problem List   Diagnosis   • Hypertension   • Hep C w/o coma, chronic (CMS/HCC)   • Encounter for immunization   • Dermatitis   • Back pain   • Abnormal transaminases   • Obesity   • Erectile dysfunction   • Weakness of both lower extremities   • Liver mass   • HTN (hypertension)   • Hepatitis C   • Secondary biliary cirrhosis (CMS/HCC)   • Abnormal CT of liver        Past Medical History:   Diagnosis Date   • Back pain    • Hep C w/o coma, chronic (CMS/HCC)    • Hypertension         Past Surgical History:   Procedure Laterality Date   • WISDOM TOOTH EXTRACTION         Visit Dx:     ICD-10-CM ICD-9-CM   1. Weakness of both lower extremities R29.898 729.89   2. Chronic bilateral low back pain with sciatica, sciatica laterality unspecified M54.40 724.2    G89.29 724.3     338.29   3. Gait abnormality R26.9 781.2             PT Ortho     Row Name 19 1100       Myotomal Screen- Lower Quarter Clearing    Hip flexion (L2)  Bilateral:;4+ (Good +)  -GR    Knee extension (L3)  Bilateral:;4+ (Good +)  -GR    Ankle DF (L4)  Bilateral:;5 (Normal)  -GR    Great toe extension (L5)  Bilateral:;5 (Normal)  -GR    Knee flexion (S2)  Bilateral:;4 (Good)  -GR       Lumbar ROM Screen- Lower Quarter Clearing    Lumbar Flexion  Normal  -GR    Lumbar Extension  Normal  -GR    Lumbar Lateral Flexion  Normal  -GR    Lumbar Rotation  Normal  -GR      User Key  (r) = Recorded By, (t) = Taken By, (c) = Cosigned By    Initials Name Provider Type    GR Yvan Obregon T, PT Physical Therapist                            PT OP Goals     Row Name 19 1100          PT Short Term Goals    STG Date to Achieve  19  -GR     STG 1  Patient will be independent with initial HEP.  -GR     STG 1 Progress  Met  -GR     STG 1 Progress Comments   initial met  -GR     STG 2  Patient will ambulate 100 ft with least resrictive AD and increasing elke.  -GR     STG 2 Progress  Partially Met  -GR     STG 2 Progress Comments  observed in clinic on 8/19  -GR     STG 3  Patient will deny falls.  -GR     STG 3 Progress  Met  -        Long Term Goals    LTG Date to Achieve  09/05/19  -GR     LTG 1  Patient will be independent with progressive HEP for long term management of current condition.  -GR     LTG 1 Progress  Ongoing;Progressing  -GR     LTG 2  Patient will score </= 40% disability on the modified ADRIENNE to indicate improved perceived performance with ADLs.  -GR     LTG 2 Progress  Ongoing  -GR     LTG 2 Progress Comments  54%  -GR     LTG 3  Patient will return to reciprocal stair climbing with single rail for safety PRN.  -GR     LTG 3 Progress  Partially Met  -GR     LTG 3 Progress Comments  reciprocal 2 rails  -GR     LTG 4  Patient will demonstrate safe lifting/transfer mechanics for long term spine preservation and return to work.  -GR     LTG 4 Progress  Ongoing  -GR     LTG 5  Patient will ambulate 250 ft without assist for community reintegration.  -GR     LTG 5 Progress  Ongoing  -GR     LTG 5 Progress Comments  RW  -GR       User Key  (r) = Recorded By, (t) = Taken By, (c) = Cosigned By    Initials Name Provider Type    Yvan Barfield, PT Physical Therapist          PT Assessment/Plan     Row Name 08/19/19 1255          PT Assessment    Assessment Comments  Mr. Longoria has attended 7 sessions of skilled PT for BLE weakness. Objectively he demonstrates improved lower quarter strength of hip flexors, quadriceps per MMT.  He has returned to reciprocal stair climibing, although continues to require assist of B rails. He remains ambulatory with RW for any community navigation and he is on short-term disability from work.  While motivated, he has been inconsistent with arrival time creating occasional difficulty with appropriate progression or  "balance/gait program in clinic. If to continue recommend increase focus on balance and functional gait with patient compliance/adherence improved.  Thank you for this referral.  -GR        PT Plan    PT Plan Comments  Continue x 6 visits with increased focus on gait and balance.  -GR       User Key  (r) = Recorded By, (t) = Taken By, (c) = Cosigned By    Initials Name Provider Type    GR Yvan Obregon, PT Physical Therapist            Exercises     Row Name 08/19/19 1200             Subjective Comments    Subjective Comments  Patient states he was approved for short term disability. Would like to return even on light duty when able. Doing some short distance walking with a \"stick\" otherwise hesitant in the community without his RW. Frustrated with vascular services at [place].  -GR         Subjective Pain    Able to rate subjective pain?  yes  -GR      Pre-Treatment Pain Level  0  -GR         Total Minutes    69754 - Gait Training Minutes   15  -GR      10210 -  PT Neuromuscular Reeducation Minutes  25  -GR         Exercise 1    Exercise Name 1  Nustep L8 UE/LE  -GR      Time 1  11 min  -GR      Additional Comments  independently prior to session  -GR         Exercise 2    Exercise Name 2  High knee slow march  -GR      Reps 2  // bars 4 lengths  -GR      Additional Comments  no hands  -GR         Exercise 3    Exercise Name 3  Tandem walk  -GR      Reps 3  // bars 4 lengths  -GR      Additional Comments  no hands  -GR         Exercise 4    Exercise Name 4  \"carioca\"  -GR      Reps 4  // bars 4 lengths  -GR      Additional Comments  intermittent hands; partial range achieved  -GR         Exercise 5    Exercise Name 5  gait with SC x 150 ft, LBQC x 75 ft  -GR      Additional Comments  cueing for correct use of device  -GR         Exercise 6    Exercise Name 6  Airex/green pad - 1 ft each and switched  -GR      Cueing 6  Demo  -GR      Sets 6  2  -GR      Reps 6  20  -GR      Additional Comments  head turns (both " directions), eyes closed, arm swings, trunk rotation arm swings  -GR         Exercise 7    Exercise Name 7  airex rhomberg  -GR      Reps 7  20  -GR      Additional Comments  head turns (both directions), eyes closed, arm swings, trunk rotation arm swings  -GR        User Key  (r) = Recorded By, (t) = Taken By, (c) = Cosigned By    Initials Name Provider Type    GR Yvan Obregon, PT Physical Therapist                        Outcome Measure Options: Modifed Owestry  Modified Oswestry  Modified Oswestry Score/Comments: 54% disability      Time Calculation:     Start Time: 1130  Stop Time: 1210  Time Calculation (min): 40 min  Total Timed Code Minutes- PT: 40 minute(s)     Therapy Charges for Today     Code Description Service Date Service Provider Modifiers Qty    04793739460 HC PT NEUROMUSC RE EDUCATION EA 15 MIN 8/19/2019 Yvan Obregon, PT GP 2    43265158791 HC GAIT TRAINING EA 15 MIN 8/19/2019 Yvan Obregon, PT GP 1          PT G-Codes  Outcome Measure Options: Modifed Owestry  Modified Oswestry Score/Comments: 54% disability         Yvan Obregon, PT  8/19/2019

## 2019-08-28 ENCOUNTER — TRANSCRIBE ORDERS (OUTPATIENT)
Dept: CARDIOLOGY | Facility: HOSPITAL | Age: 49
End: 2019-08-28

## 2019-08-28 ENCOUNTER — TRANSCRIBE ORDERS (OUTPATIENT)
Dept: ADMINISTRATIVE | Facility: HOSPITAL | Age: 49
End: 2019-08-28

## 2019-08-28 DIAGNOSIS — I73.9 CLAUDICATION (HCC): Primary | ICD-10-CM

## 2019-08-29 ENCOUNTER — HOSPITAL ENCOUNTER (OUTPATIENT)
Dept: PHYSICAL THERAPY | Facility: HOSPITAL | Age: 49
Setting detail: THERAPIES SERIES
Discharge: HOME | End: 2019-08-29

## 2019-08-29 DIAGNOSIS — R29.898 WEAKNESS OF BOTH LOWER EXTREMITIES: Primary | ICD-10-CM

## 2019-08-29 DIAGNOSIS — M54.40 CHRONIC BILATERAL LOW BACK PAIN WITH SCIATICA, SCIATICA LATERALITY UNSPECIFIED: ICD-10-CM

## 2019-08-29 DIAGNOSIS — R26.9 GAIT ABNORMALITY: ICD-10-CM

## 2019-08-29 DIAGNOSIS — G89.29 CHRONIC BILATERAL LOW BACK PAIN WITH SCIATICA, SCIATICA LATERALITY UNSPECIFIED: ICD-10-CM

## 2019-08-29 PROCEDURE — 97112 NEUROMUSCULAR REEDUCATION: CPT

## 2019-08-29 PROCEDURE — 97116 GAIT TRAINING THERAPY: CPT

## 2019-08-29 NOTE — THERAPY TREATMENT NOTE
Outpatient Physical Therapy Ortho Treatment Note  [facility]     Patient Name: Marlon Longoria  : 1970  MRN: [id number]  Today's Date: 2019      Visit Date: 2019    Visit Dx:    ICD-10-CM ICD-9-CM   1. Weakness of both lower extremities R29.898 729.89   2. Chronic bilateral low back pain with sciatica, sciatica laterality unspecified M54.40 724.2    G89.29 724.3     338.29   3. Gait abnormality R26.9 781.2       Patient Active Problem List   Diagnosis   • Hypertension   • Hep C w/o coma, chronic (CMS/HCC)   • Encounter for immunization   • Dermatitis   • Back pain   • Abnormal transaminases   • Obesity   • Erectile dysfunction   • Weakness of both lower extremities   • Liver mass   • HTN (hypertension)   • Hepatitis C   • Secondary biliary cirrhosis (CMS/HCC)   • Abnormal CT of liver        Past Medical History:   Diagnosis Date   • Back pain    • Hep C w/o coma, chronic (CMS/HCC)    • Hypertension         Past Surgical History:   Procedure Laterality Date   • WISDOM TOOTH EXTRACTION                         PT Assessment/Plan     Row Name 19 1610          PT Assessment    Assessment Comments  Continue to work on gait and balance. Added side step gait cailin direction 75 ft  -WS       User Key  (r) = Recorded By, (t) = Taken By, (c) = Cosigned By    Initials Name Provider Type    Mo Gonsales PTA Physical Therapy Assistant            Exercises     Row Name 19 1530             Subjective Comments    Subjective Comments  A little pain with sit to stand  -         Subjective Pain    Able to rate subjective pain?  yes  -WS      Pre-Treatment Pain Level  0  -WS         Total Minutes    12529 - Gait Training Minutes   15  -WS      78443 -  PT Neuromuscular Reeducation Minutes  25  -WS         Exercise 1    Exercise Name 1  Bike L4  -WS      Time 1  8 min  -WS         Exercise 2    Exercise Name 2  High knee slow march  -      Reps 2  // bars 4 lengths  -WS      Additional  "Comments  no hands  -WS         Exercise 3    Exercise Name 3  Tandem walk  -WS      Cueing 3  Demo  -WS      Reps 3  // bars 4 lengths  -WS      Additional Comments  no hands  -WS         Exercise 4    Exercise Name 4  \"carioca\"  -WS      Reps 4  // bars 4 lengths  -WS      Additional Comments  intermittent hands  -WS         Exercise 5    Exercise Name 5  gait with SC x 150 ft, sidestep 75t  -WS         Exercise 6    Exercise Name 6  Airex/green pad - 1 ft each and switched  -WS      Cueing 6  Demo  -WS      Sets 6  2  -WS      Reps 6  20  -WS      Additional Comments  head turns, eyes closed, arm swings, trunk rot w/ arm swings  -WS         Exercise 7    Exercise Name 7  airex rhomberg  -WS      Reps 7  20  -WS      Additional Comments  same as above  -WS        User Key  (r) = Recorded By, (t) = Taken By, (c) = Cosigned By    Initials Name Provider Type    Mo Gonsales PTA Physical Therapy Assistant                       PT OP Goals     Row Name 08/29/19 1600          PT Short Term Goals    STG Date to Achieve  08/06/19  -WS     STG 1  Patient will be independent with initial HEP.  -WS     STG 1 Progress  Met  -WS     STG 2  Patient will ambulate 100 ft with least resrictive AD and increasing elke.  -WS     STG 2 Progress  Partially Met  -WS     STG 3  Patient will deny falls.  -     STG 3 Progress  Met  -        Long Term Goals    LTG Date to Achieve  09/05/19  -WS     LTG 1  Patient will be independent with progressive HEP for long term management of current condition.  -WS     LTG 1 Progress  Ongoing;Progressing  -WS     LTG 2  Patient will score </= 40% disability on the modified ADRIENNE to indicate improved perceived performance with ADLs.  -WS     LTG 2 Progress  Ongoing  -WS     LTG 3  Patient will return to reciprocal stair climbing with single rail for safety PRN.  -     LTG 3 Progress  Partially Met  -     LTG 4  Patient will demonstrate safe lifting/transfer mechanics for long term " spine preservation and return to work.  -     LTG 4 Progress  Ongoing  -     LTG 5  Patient will ambulate 250 ft without assist for community reintegration.  -     LTG 5 Progress  Ongoing  -       User Key  (r) = Recorded By, (t) = Taken By, (c) = Cosigned By    Initials Name Provider Type    Mo Gonsales PTA Physical Therapy Assistant          Therapy Education  Given: HEP, Symptoms/condition management, Fall prevention and home safety, Mobility training  Program: Reinforced, New  How Provided: Verbal  Provided to: Patient              Time Calculation:   Start Time: 1530  Stop Time: 1610  Time Calculation (min): 40 min  Therapy Charges for Today     Code Description Service Date Service Provider Modifiers Qty    80616898863 HC PT NEUROMUSC RE EDUCATION EA 15 MIN 8/29/2019 Mo Barrera PTA GP 2    40832546261 HC GAIT TRAINING EA 15 MIN 8/29/2019 Mo Barrera PTA GP 1                    Mo Barrera PTA  8/29/2019

## 2019-08-30 ENCOUNTER — HOSPITAL ENCOUNTER (OUTPATIENT)
Dept: CARDIOLOGY | Facility: HOSPITAL | Age: 49
Discharge: HOME | End: 2019-08-30
Admitting: SURGERY

## 2019-08-30 DIAGNOSIS — I73.9 CLAUDICATION (HCC): ICD-10-CM

## 2019-08-30 PROCEDURE — 93924 LWR XTR VASC STDY BILAT: CPT

## 2019-08-31 LAB
BH CV LOWER ARTERIAL LEFT ABI RATIO: 1.19
BH CV LOWER ARTERIAL LEFT DORSALIS PEDIS SYS MAX: 164 MMHG
BH CV LOWER ARTERIAL LEFT GREAT TOE SYS MAX: 147 MMHG
BH CV LOWER ARTERIAL LEFT POST EX ABI RATIO: 1.23
BH CV LOWER ARTERIAL LEFT POST TIBIAL SYS MAX: 169 MMHG
BH CV LOWER ARTERIAL LEFT TBI RATIO: 1.04
BH CV LOWER ARTERIAL RIGHT ABI RATIO: 1.26
BH CV LOWER ARTERIAL RIGHT DORSALIS PEDIS SYS MAX: 179 MMHG
BH CV LOWER ARTERIAL RIGHT GREAT TOE SYS MAX: 142 MMHG
BH CV LOWER ARTERIAL RIGHT POST EX ABI RATIO: 1.21
BH CV LOWER ARTERIAL RIGHT POST TIBIAL SYS MAX: 178 MMHG
BH CV LOWER ARTERIAL RIGHT TBI RATIO: 1
UPPER ARTERIAL LEFT ARM BRACHIAL SYS MAX: 142 MMHG
UPPER ARTERIAL RIGHT ARM BRACHIAL SYS MAX: 136 MMHG

## 2019-09-04 ENCOUNTER — HOSPITAL ENCOUNTER (OUTPATIENT)
Dept: PHYSICAL THERAPY | Facility: HOSPITAL | Age: 49
Setting detail: THERAPIES SERIES
Discharge: HOME | End: 2019-09-04

## 2019-09-04 DIAGNOSIS — G89.29 CHRONIC BILATERAL LOW BACK PAIN WITH SCIATICA, SCIATICA LATERALITY UNSPECIFIED: ICD-10-CM

## 2019-09-04 DIAGNOSIS — R26.9 GAIT ABNORMALITY: ICD-10-CM

## 2019-09-04 DIAGNOSIS — R29.898 WEAKNESS OF BOTH LOWER EXTREMITIES: Primary | ICD-10-CM

## 2019-09-04 DIAGNOSIS — M54.40 CHRONIC BILATERAL LOW BACK PAIN WITH SCIATICA, SCIATICA LATERALITY UNSPECIFIED: ICD-10-CM

## 2019-09-04 PROCEDURE — 97112 NEUROMUSCULAR REEDUCATION: CPT

## 2019-09-04 PROCEDURE — 97116 GAIT TRAINING THERAPY: CPT

## 2019-09-04 NOTE — THERAPY TREATMENT NOTE
"    Outpatient Physical Therapy Ortho Treatment Note  [facility]     Patient Name: Marlon Longoria  : 1970  MRN: [id number]  Today's Date: 2019      Visit Date: 2019    Visit Dx:    ICD-10-CM ICD-9-CM   1. Weakness of both lower extremities R29.898 729.89   2. Chronic bilateral low back pain with sciatica, sciatica laterality unspecified M54.40 724.2    G89.29 724.3     338.29   3. Gait abnormality R26.9 781.2       Patient Active Problem List   Diagnosis   • Hypertension   • Hep C w/o coma, chronic (CMS/HCC)   • Encounter for immunization   • Dermatitis   • Back pain   • Abnormal transaminases   • Obesity   • Erectile dysfunction   • Weakness of both lower extremities   • Liver mass   • HTN (hypertension)   • Hepatitis C   • Secondary biliary cirrhosis (CMS/HCC)   • Abnormal CT of liver        Past Medical History:   Diagnosis Date   • Back pain    • Hep C w/o coma, chronic (CMS/HCC)    • Hypertension         Past Surgical History:   Procedure Laterality Date   • WISDOM TOOTH EXTRACTION                         PT Assessment/Plan     Row Name 19 1742          PT Assessment    Assessment Comments  Worked on gait back hallwith/without SC.   -WS       User Key  (r) = Recorded By, (t) = Taken By, (c) = Cosigned By    Initials Name Provider Type     Mo Barrera PTA Physical Therapy Assistant            OP Exercises     Row Name 19 1700             Total Minutes    35828 - Gait Training Minutes   15  -WS      51990 -  PT Neuromuscular Reeducation Minutes  30  -WS         Exercise 1    Exercise Name 1  Bike L5  -WS      Time 1  8 min  -WS         Exercise 2    Exercise Name 2  High knee slow march  -WS      Reps 2  // bars 4 lengths  -WS      Additional Comments  no hands  -WS         Exercise 3    Exercise Name 3  Tandem walk  -WS      Cueing 3  Demo  -WS      Reps 3  // bars 4 lengths  -WS      Additional Comments  no hands  -WS         Exercise 4    Exercise Name 4  \"carioca\"  -WS  "     Reps 4  // bars 4 lengths  -WS      Additional Comments  interrmittent hands  -WS         Exercise 5    Exercise Name 5  gait with/without SC x 150 ft, sidestep 75t  -WS      Additional Comments  forward and sidestep  -WS         Exercise 6    Exercise Name 6  Airex/green pad - 1 ft each and switched  -WS      Cueing 6  Demo  -WS      Sets 6  2  -WS      Reps 6  20  -WS      Additional Comments  head turns, eyes closed, arm swings, trunk rot  -WS         Exercise 7    Exercise Name 7  airex rhomberg  -WS      Reps 7  20  -WS      Additional Comments  same as above  -WS         Exercise 8    Exercise Name 8  mini squat no hands  -WS      Reps 8  10  -WS        User Key  (r) = Recorded By, (t) = Taken By, (c) = Cosigned By    Initials Name Provider Type    Mo Gonsales PTA Physical Therapy Assistant                       PT OP Goals     Row Name 09/04/19 1700          PT Short Term Goals    STG Date to Achieve  08/06/19  -WS     STG 1  Patient will be independent with initial HEP.  -WS     STG 1 Progress  Met  -     STG 2  Patient will ambulate 100 ft with least resrictive AD and increasing elke.  -WS     STG 2 Progress  Partially Met  -WS     STG 3  Patient will deny falls.  -     STG 3 Progress  Met  -        Long Term Goals    LTG Date to Achieve  09/05/19  -     LTG 1  Patient will be independent with progressive HEP for long term management of current condition.  -     LTG 1 Progress  Ongoing;Progressing  -WS     LTG 2  Patient will score </= 40% disability on the modified ADRIENNE to indicate improved perceived performance with ADLs.  -     LTG 2 Progress  Ongoing  -WS     LTG 3  Patient will return to reciprocal stair climbing with single rail for safety PRN.  -     LTG 3 Progress  Partially Met  -     LTG 4  Patient will demonstrate safe lifting/transfer mechanics for long term spine preservation and return to work.  -     LTG 4 Progress  Ongoing  -     LTG 5  Patient will  ambulate 250 ft without assist for community reintegration.  -WS     LTG 5 Progress  Ongoing  -WS       User Key  (r) = Recorded By, (t) = Taken By, (c) = Cosigned By    Initials Name Provider Type    Mo Gonsales PTA Physical Therapy Assistant          Therapy Education  Given: HEP, Symptoms/condition management, Posture/body mechanics, Mobility training  Program: Reinforced  How Provided: Verbal  Provided to: Patient              Time Calculation:   Start Time: 1600  Stop Time: 1645  Time Calculation (min): 45 min  Therapy Charges for Today     Code Description Service Date Service Provider Modifiers Qty    01081437912 HC PT NEUROMUSC RE EDUCATION EA 15 MIN 9/4/2019 Mo Barrera PTA GP 2    95677778148 HC GAIT TRAINING EA 15 MIN 9/4/2019 Mo Barrera PTA GP 1                    Mo Barrera PTA  9/4/2019

## 2019-09-06 ENCOUNTER — TELEPHONE (OUTPATIENT)
Dept: INTERNAL MEDICINE | Age: 49
End: 2019-09-06

## 2019-09-06 ENCOUNTER — APPOINTMENT (OUTPATIENT)
Dept: PHYSICAL THERAPY | Facility: HOSPITAL | Age: 49
End: 2019-09-06

## 2019-09-06 NOTE — TELEPHONE ENCOUNTER
Vascular exam and procedure note was negative. No active disease causing symptoms. If patient is still having weakness that is impairing ability to work, he needs to start fresh with initial evaluation for restrictions. This can be done through Occupational Therapy or through PT. As for the workup we have done and specialties have completed, there is no medical reason to keep him off work at this time. I did not take him off work originally, either. RH

## 2019-09-06 NOTE — TELEPHONE ENCOUNTER
Pt seen Dr. Jack Ferguson with vascular on 8/28/19; however pt is asking what is the next step for him.    I directed the [facility] office note from Dr. Ferguson to KELLEE Ly to review.    Pls advise.    Pt can be reached #[phone].

## 2019-09-06 NOTE — TELEPHONE ENCOUNTER
Brielle called from [employer]. Pt called and and stated he would be better off dead. Brielle called the office just to let use know what was siad. She was informed that  I spoke with pt and he made an apt for 9/10/19.

## 2019-09-09 ENCOUNTER — APPOINTMENT (OUTPATIENT)
Dept: PHYSICAL THERAPY | Facility: HOSPITAL | Age: 49
End: 2019-09-09

## 2019-09-10 ENCOUNTER — DOCUMENTATION (OUTPATIENT)
Dept: INTERNAL MEDICINE | Age: 49
End: 2019-09-10

## 2019-09-10 NOTE — PROGRESS NOTES
Patient scheduled for acute visit in office today for ANNA LE numbness, without known underlying etiology x >6 months. He called our office reporting that he has not been able to move his legs all day and cannot get to the office for his visit. I have instructed him that if he cannot move his legs, he needs to call EMS and go to the ED for further evaluation.     Prior evaluations for ongoing ANNA LE symptoms include: [facility] and PT, [facility] surgery (all imaging studies have been negative for cause of symptoms), EMG/NCV study (within normal limits in both lower extremities), and Vascular evaluation (negative). At this time, it is recommended that he complete Neurology and Psychiatry evaluations. Possible psycho-somatic etiology and depression (one episode of reported SI on telephone with insurance company).

## 2019-09-11 ENCOUNTER — TELEPHONE (OUTPATIENT)
Dept: INTERNAL MEDICINE | Age: 49
End: 2019-09-11

## 2019-09-11 ENCOUNTER — HOSPITAL ENCOUNTER (OUTPATIENT)
Dept: PHYSICAL THERAPY | Facility: HOSPITAL | Age: 49
Setting detail: THERAPIES SERIES
Discharge: HOME | End: 2019-09-11

## 2019-09-11 DIAGNOSIS — G89.29 CHRONIC BILATERAL LOW BACK PAIN WITH SCIATICA, SCIATICA LATERALITY UNSPECIFIED: ICD-10-CM

## 2019-09-11 DIAGNOSIS — M54.40 CHRONIC BILATERAL LOW BACK PAIN WITH SCIATICA, SCIATICA LATERALITY UNSPECIFIED: ICD-10-CM

## 2019-09-11 DIAGNOSIS — R29.898 WEAKNESS OF BOTH LOWER EXTREMITIES: Primary | ICD-10-CM

## 2019-09-11 DIAGNOSIS — R26.9 GAIT ABNORMALITY: ICD-10-CM

## 2019-09-11 PROCEDURE — 97112 NEUROMUSCULAR REEDUCATION: CPT | Performed by: PHYSICAL THERAPIST

## 2019-09-11 NOTE — TELEPHONE ENCOUNTER
Pt states Friday, 9/6/19 was a bad day due to speaking with the short term disability lady about how none of the doctors he has seen re: his medical issues will sign off on his paperwork, which led to a state of depression where thoughts of suicide were crossing his mind. This state of depression went on all weekend from Friday, 9/6/19- Monday, 9/9/19. Pt states through the weekend he was unable to move his legs & difficulty walking. Pt uses a urinal to void. Pt states he does elevate his legs; however the nerves in his legs cause numbness during elevation. Pt also missed his physical therapy appt that was scheduled for Monday, 9/9/19 due to lack of transportation, leg pain & unable to move. Pt states he is going to his scheduled physical therapy appt today-Wednesday, 9/11/19 at 11a. Pt will call a friend, taxi, lyft or uber to get to the appt. Pt has zero family in [place], [place]; all family resides in [place]. Pt relocated to [place], [place] in 2005 & began work at [employer]. Pt claims to be independent, an introvert. Pt is scheduled to meet with a psychiatrist at [facility] on Friday, 9/13/19 at 2:15p; pt states transportation will be provided by someone at [facility] or he will call an Uber or Lyft depending on his financials. Pt states he declined the ED yesterday, 9/10/19 due to financial reasons; however scheduled a new appt to see KELLEE Ly for Thursday, 9/12/19 at 8a for 30 minutes to discuss leg numbness, fatigue, depression, discuss the next step, short term disability paperwork, neurology. I did encourage pt to call our office or call 911 if he needs to talk to someone or if the thoughts of suicide cross his mind again. Pt verbalized understanding. MO

## 2019-09-11 NOTE — THERAPY TREATMENT NOTE
Outpatient Physical Therapy Ortho Treatment Note  [facility]     Patient Name: Marlon Longoria  : 1970  MRN: [id number]  Today's Date: 2019      Visit Date: 2019    Visit Dx:    ICD-10-CM ICD-9-CM   1. Weakness of both lower extremities R29.898 729.89   2. Chronic bilateral low back pain with sciatica, sciatica laterality unspecified M54.40 724.2    G89.29 724.3     338.29   3. Gait abnormality R26.9 781.2       Patient Active Problem List   Diagnosis   • Hypertension   • Hep C w/o coma, chronic (CMS/HCC)   • Encounter for immunization   • Dermatitis   • Back pain   • Abnormal transaminases   • Obesity   • Erectile dysfunction   • Weakness of both lower extremities   • Liver mass   • HTN (hypertension)   • Hepatitis C   • Secondary biliary cirrhosis (CMS/HCC)   • Abnormal CT of liver        Past Medical History:   Diagnosis Date   • Back pain    • Hep C w/o coma, chronic (CMS/HCC)    • Hypertension         Past Surgical History:   Procedure Laterality Date   • WISDOM TOOTH EXTRACTION                         PT Assessment/Plan     Row Name 19 1241          PT Assessment    Assessment Comments  Mr. Longoria has attended 10 sessions of skilled PT. He has cx/ns 3 appts and been challenged with tardiness frequently.  He has many comorbidities/personal factors contributing to this.  He has waxed/wained from cane to walker and is progressing with a home program.  He can continue with skilled PT in this setting however due to his attendance he will have to schedule day of appt as openings allow.  -GR        PT Plan    PT Plan Comments  Continue as per assessment. Schedule day of as available per ns/cx policy.  -GR       User Key  (r) = Recorded By, (t) = Taken By, (c) = Cosigned By    Initials Name Provider Type    Yvan Barfield, PT Physical Therapist            OP Exercises     Row Name 19 1100             Subjective Comments    Subjective Comments  Arrives at 1110 for 1100 appt.  States he has gone into depression and seeking psychiatric treatment.  -GR         Subjective Pain    Able to rate subjective pain?  yes  -GR         Total Minutes    53222 - PT Therapeutic Exercise Minutes  7  -GR      09834 -  PT Neuromuscular Reeducation Minutes  23  -GR         Exercise 1    Exercise Name 1  Bike L5  -GR      Time 1  7 min  -GR      Additional Comments  while obtaining subjective  -GR         Exercise 2    Exercise Name 2  High knee slow march  -GR      Reps 2  // bars 4 lengths  -GR      Additional Comments  intermittent hands  -GR         Exercise 3    Exercise Name 3  Tandem walk  -GR      Cueing 3  Demo  -GR      Reps 3  // bars 4 lengths  -GR      Additional Comments  fwd and retro intermittent hands  -GR         Exercise 7    Exercise Name 7  rhomberg  -GR      Sets 7  1  -GR      Reps 7  1  -GR      Time 7  30 seconds  -GR      Additional Comments  option for eyes closed  -GR         Exercise 8    Exercise Name 8  mini squat no hands  -GR      Additional Comments  reviewed  -GR         Exercise 9    Exercise Name 9  tandem  -GR      Sets 9  1  -GR      Reps 9  1  -GR      Time 9  30 seconds  -GR      Additional Comments  option for eyes closed at home  -GR         Exercise 10    Exercise Name 10  sidestepping GTB at knees  -GR      Reps 10  4 lengths of // bars  -GR      Additional Comments  intermittent hands  -GR        User Key  (r) = Recorded By, (t) = Taken By, (c) = Cosigned By    Initials Name Provider Type    GR Yvan Obregon, PT Physical Therapist                       PT OP Goals     Row Name 09/11/19 1200          PT Short Term Goals    STG Date to Achieve  08/06/19  -GR     STG 1  Patient will be independent with initial HEP.  -GR     STG 1 Progress  Met  -GR     STG 2  Patient will ambulate 100 ft with least resrictive AD and increasing elke.  -GR     STG 2 Progress  Partially Met  -GR     STG 3  Patient will deny falls.  -GR     STG 3 Progress  Met  -GR        Long  Term Goals    LTG Date to Achieve  09/05/19  -GR     LTG 1  Patient will be independent with progressive HEP for long term management of current condition.  -GR     LTG 1 Progress  Ongoing;Progressing  -GR     LTG 2  Patient will score </= 40% disability on the modified ADRIENNE to indicate improved perceived performance with ADLs.  -GR     LTG 2 Progress  Ongoing  -GR     LTG 3  Patient will return to reciprocal stair climbing with single rail for safety PRN.  -GR     LTG 3 Progress  Partially Met  -GR     LTG 4  Patient will demonstrate safe lifting/transfer mechanics for long term spine preservation and return to work.  -GR     LTG 4 Progress  Ongoing  -GR     LTG 5  Patient will ambulate 250 ft without assist for community reintegration.  -GR     LTG 5 Progress  Ongoing  -GR     LTG 5 Progress Comments  returned to walker  -       User Key  (r) = Recorded By, (t) = Taken By, (c) = Cosigned By    Initials Name Provider Type     Yvan Obregon, PT Physical Therapist          Therapy Education  Education Details: scheduling policy, update to HEP and use of slies on walker              Time Calculation:   Start Time: 1110  Stop Time: 1140  Time Calculation (min): 30 min  Total Timed Code Minutes- PT: 30 minute(s)  Therapy Charges for Today     Code Description Service Date Service Provider Modifiers Qty    20729045343 HC PT NEUROMUSC RE EDUCATION EA 15 MIN 9/11/2019 Yvan Obregon, PT GP 2                    Yvan Obregon, PT  9/11/2019

## 2019-09-12 ENCOUNTER — OFFICE VISIT (OUTPATIENT)
Dept: INTERNAL MEDICINE | Age: 49
End: 2019-09-12

## 2019-09-12 VITALS
HEIGHT: 70 IN | OXYGEN SATURATION: 97 % | DIASTOLIC BLOOD PRESSURE: 76 MMHG | TEMPERATURE: 98.5 F | BODY MASS INDEX: 45.1 KG/M2 | SYSTOLIC BLOOD PRESSURE: 136 MMHG | HEART RATE: 111 BPM | WEIGHT: 315 LBS

## 2019-09-12 DIAGNOSIS — R29.898 WEAKNESS OF BOTH LOWER EXTREMITIES: Primary | ICD-10-CM

## 2019-09-12 DIAGNOSIS — M54.50 LUMBAR SPINE PAIN: ICD-10-CM

## 2019-09-12 DIAGNOSIS — R26.9 ABNORMALITY OF GAIT: ICD-10-CM

## 2019-09-12 DIAGNOSIS — R20.0 LOWER EXTREMITY NUMBNESS: ICD-10-CM

## 2019-09-12 PROCEDURE — 99214 OFFICE O/P EST MOD 30 MIN: CPT | Performed by: NURSE PRACTITIONER

## 2019-09-12 NOTE — PROGRESS NOTES
Marlon Longoria / 49 y.o. / male  Encounter Date: 09/12/2019    ASSESSMENT & PLAN:    Problem List Items Addressed This Visit        Nervous and Auditory    Weakness of both lower extremities - Primary    Relevant Orders    MRI Brain With & Without Contrast    Ambulatory Referral to Neurology      Other Visit Diagnoses     Lower extremity numbness        Relevant Orders    MRI Brain With & Without Contrast    Ambulatory Referral to Neurology    Lumbar spine pain        Relevant Orders    MRI Brain With & Without Contrast    Ambulatory Referral to Neurology    Abnormality of gait            Orders Placed This Encounter   Procedures   • MRI Brain With & Without Contrast   • Ambulatory Referral to Neurology     No orders of the defined types were placed in this encounter.      Summary/Discussion:  1. Referral for neurology evaluation.   2. MRI brain to r/o mass/tumor/MS/TIA/Stroke history that could be underlying etiology for symptoms.   3. Continue weekly outpatient physical therapy as ordered, patient agrees that he has been continuing sessions until this week, and plans to all to get scheduled to resume. I instructed him that it is imperative he complete home exercises and stretches provided from PT in order to maintain good outcomes and progress.   4. I have advised him that psychiatry evaluation is very important at this time considering depression, and physical ailments with no known etiology at this time.  I have requested that he follow-up with me regarding psychiatrist recommendations and plan of care.  He agreed to do this. I have also instructed him to call our office or his psychiatrist if suicidal thoughts recur. He verbalized agreement.   5. Follow up with me shortly in 2 months or sooner if necessary.     Return in about 2 months (around 11/12/2019) for Check Up on chronic issues.  ________________________________________________________________    VITALS:    Visit Vitals  /76   Pulse 111   Temp 98.5  "°F (36.9 °C) (Temporal)   Ht 177.8 cm (70\")   Wt (!) 144 kg (317 lb)   SpO2 97%   BMI 45.48 kg/m²       BP Readings from Last 3 Encounters:   09/12/19 136/76   07/18/19 160/90   06/03/19 118/80     Wt Readings from Last 3 Encounters:   09/12/19 (!) 144 kg (317 lb)   07/18/19 (!) 150 kg (331 lb)   06/29/19 (!) 147 kg (324 lb 1.2 oz)      Body mass index is 45.48 kg/m².    CC: Main reason(s) for today's visit: Numbness and Spasms      HPI    Patient is a 49 y.o. male who is here for follow up on ANNA LE weakness, described as numbness and L hamstring \"locking up\" spasms, for 6 months. 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 I have seen him for these problems previously.  He states he felt like he pulled a back muscle when he was working and the next day he could barely stand or walk. He waited 3 days to see if the pain and weakness would improve and allow him to walk, but it did not so he went to the ED and was admitted to the hospital from 4/15/19 to 4/26/19 with ANNA LE weakness. Hospital workup was essentially negative and he had little improvement with physical therapy. He was discharged to [facility] for continued rehabilitation therapy where he lived for approximately one month in May 2019, with minimal improvement physically.     He has undergone significant evaluation and diagnostics to determine source of weakness and variable symptoms of ANNA LE spasms, lumbar pain, and ANNA LE weakness. A summary of these diagnostic evaluations in short includes:   - [facility] Spine evaluation, EMG/NCV testing (negative), CT and MRI spine cervical, thoracic and lumbar (negative for etiology to explain symptoms)  - Vascular Surgery evaluation and testing (negative)  - All labs/testing negative for etiology of complaints  -Initial outpatient physical therapy evaluation completed 7/22/19, he has been fairly inconsistent with appearing for visits.    Today he states that he has not had any " "improvement or changes in symptoms.  He states that laying flat on his back for extended periods of time causes lumbar spine pain, sitting only causes bilateral upper leg and pelvis dull sensation of numbness, without incontinence, and standing initially causes left hamstring to \"lock up\".  He states that he has dull numbness sensation in bilateral feet at all times.  The pain and weakness in his legs is worse on the left than the right.  When he was in the hospital he also describes some saddle numbness, with pressure on his groin area, that has changed and somewhat improved however is still present.  He denies any neurologic symptoms such as upper extremity weakness, pain, numbness, visual changes or impairment, dizziness, headache, head pressure, cranial nerve deficits.   Over this past weekend, he became extremely frustrated with insurance and medical providers and made the comment to his insurance provider that he would be \"better off dead\", and he admits to significant depression today in the office.  Most of this depression is related to his physical impairments.  He states that he is wanting to get back to work, however he is unable to physically, and it is causing him significant financial strain, burden, psychological depression.  He is scheduled to have a visit with a psychiatrist at his place of work, [employer] and outpatient facilities, tomorrow.  I have advised him that psychiatry evaluation is very important at this time considering depression, and physical ailments with no known etiology at this time.  I have requested that he follow-up with me regarding psychiatrist recommendations and plan of care.  He agreed to do this. He denies any suicidal or homicidal ideation, suicidal plan or intent, at this time.       Patient Care Team:  Aliyah Flores APRN as PCP - General (Nurse Practitioner)  ____________________________________________________________________    REVIEW OF " "SYSTEMS    Review of Systems   Constitutional: Positive for fatigue. Negative for activity change, appetite change, chills, diaphoresis, fever and unexpected weight change.   HENT: Negative.    Eyes: Negative.  Negative for visual disturbance.   Respiratory: Negative.  Negative for cough and shortness of breath.    Cardiovascular: Negative.  Negative for chest pain, palpitations and leg swelling.   Gastrointestinal: Negative.    Musculoskeletal: Positive for back pain (lumbar), gait problem (weak, using walker) and myalgias (ANNA LE). Negative for arthralgias, joint swelling, neck pain and neck stiffness.   Skin: Negative.    Neurological: Positive for weakness and numbness. Negative for dizziness, tremors, seizures, syncope, facial asymmetry, speech difficulty, light-headedness and headaches.   Psychiatric/Behavioral: Positive for agitation. Negative for confusion, decreased concentration, self-injury, sleep disturbance and suicidal ideas. The patient is not nervous/anxious.        PHYSICAL EXAMINATION    Physical Exam   Constitutional: He is oriented to person, place, and time. He appears well-developed and well-nourished. He appears distressed (mildly).   HENT:   Head: Normocephalic.   Neck: Normal range of motion.   Cardiovascular: Normal rate, regular rhythm and normal heart sounds.   Pulmonary/Chest: Effort normal and breath sounds normal.   Musculoskeletal: He exhibits tenderness (R lumbar spine). He exhibits no edema.        Lumbar back: He exhibits tenderness (R>L) and pain. He exhibits normal range of motion, no bony tenderness, no swelling, no deformity and no spasm.        Left upper leg: He exhibits no tenderness (\"locking up\" with standing, squeezing pressure pain), no bony tenderness, no swelling, no edema and no deformity.   Neurological: He is alert and oriented to person, place, and time. He displays normal reflexes. No cranial nerve deficit or sensory deficit (ANNA feet and LE sensation intact). He " exhibits normal muscle tone. Coordination (weak gait, using walker at all times) and gait abnormal.   Skin: Skin is warm and dry. He is not diaphoretic.   Psychiatric:   Mood depressed, expresses frustration with physical symptoms and restrictions as well as medical providers     Vitals reviewed.  Neurologic Exam     Mental Status   Oriented to person, place, and time.     Motor Exam     Strength   Right hamstrin/5  Left hamstrin/5  Right posterior tibial: 2/5  Left posterior tibial: 2/5    Gait, Coordination, and Reflexes     Gait  Gait: shuffling (unsteady, imbalanced. cannot bear full weight on L leg, Can bear weight on R leg but only while holding onto walker)        REVIEWED DATA:    Labs:   Lab Results   Component Value Date     2019    K 4.2 2019    AST 28 2019    ALT 23 2019    BUN 7 2019    CREATININE 1.20 2019    CREATININE 0.59 (L) 2019    CREATININE 0.57 (L) 2019    EGFRIFNONA 145 2019    EGFRIFAFRI >150 2019       Lab Results   Component Value Date    GLUCOSE 110 (H) 2019    GLUCOSE 109 (H) 2019    GLUCOSE 101 (H) 2019    HGBA1C 6.14 (H) 2019       Lab Results   Component Value Date    LDL 82 2016    HDL 53 2016    TRIG 78 2016       Lab Results   Component Value Date    TSH 0.875 2019          Lab Results   Component Value Date    WBC 9.38 2019    HGB 14.3 2019    HGB 14.4 2019    HGB 15.1 2019     2019         Imaging:      Medical Tests:      Summary of old records / correspondence / consultant report:      Request outside records:   ______________________________________________________________________    ALLERGIES  No Known Allergies     MEDICATIONS  Current Outpatient Medications on File Prior to Visit   Medication Sig   • amLODIPine (NORVASC) 5 MG tablet Take 5 mg by mouth Daily.   • busPIRone (BUSPAR) 7.5 MG tablet TAKE 1 TABLET BY MOUTH  TWICE A DAY   • cadexomer iodine (IODOSORB) 0.9 % gel Apply  topically to the appropriate area as directed.   • [employer] D3 1000 units capsule Take 1,000 Units by mouth Daily.   • HYDROcodone-acetaminophen (NORCO) 7.5-325 MG per tablet Take 1 tablet by mouth Every 8 (Eight) Hours As Needed.   • hydroxychloroquine (PLAQUENIL) 200 MG tablet    • hydrOXYzine pamoate (VISTARIL) 25 MG capsule Take 1 capsule by mouth 3 (Three) Times a Day As Needed for Itching.   • melatonin 5 MG tablet tablet Take 1 tablet by mouth At Night As Needed (sleep).   • mupirocin (BACTROBAN) 2 % ointment Apply  topically to the appropriate area as directed.   • naproxen (NAPROSYN) 500 MG tablet    • ondansetron (ZOFRAN) 4 MG tablet Take 4 mg by mouth.   • polyethylene glycol (MIRALAX) packet Take 17 g by mouth.   • sennosides-docusate sodium (SENOKOT-S) 8.6-50 MG tablet Take 2 tablets by mouth.   • simethicone (MYLICON) 80 MG chewable tablet Chew 80 mg.   • traZODone (DESYREL) 50 MG tablet Take 25 mg by mouth every night at bedtime.   • triamcinolone (KENALOG) 0.1 % cream Apply 1 application topically to the appropriate area as directed 3 (Three) Times a Day As Needed for Irritation or Rash.     No current facility-administered medications on file prior to visit.        PFSH:     The following portions of the patient's history were reviewed and updated as appropriate: Allergies / Current Medications / Past Medical History / Surgical History / Social History / Family History    PROBLEM LIST   Patient Active Problem List   Diagnosis   • Hypertension   • Hep C w/o coma, chronic (CMS/HCC)   • Encounter for immunization   • Dermatitis   • Back pain   • Abnormal transaminases   • Obesity   • Erectile dysfunction   • Weakness of both lower extremities   • Liver mass   • HTN (hypertension)   • Hepatitis C   • Secondary biliary cirrhosis (CMS/HCC)   • Abnormal CT of liver       PAST MEDICAL HISTORY  Past Medical History:   Diagnosis Date   • Back pain    •  Hep C w/o coma, chronic (CMS/HCC)    • Hypertension        SURGICAL HISTORY  Past Surgical History:   Procedure Laterality Date   • WISDOM TOOTH EXTRACTION         SOCIAL HISTORY  Social History     Socioeconomic History   • Marital status: Single     Spouse name: Not on file   • Number of children: Not on file   • Years of education: Not on file   • Highest education level: Not on file   Occupational History   • Occupation: Mental health associate   Tobacco Use   • Smoking status: Heavy Tobacco Smoker     Packs/day: 1.00     Years: 6.00     Pack years: 6.00     Types: Cigars     Start date: 2010   • Smokeless tobacco: Never Used   • Tobacco comment: Pt declined medication at this time.   Substance and Sexual Activity   • Alcohol use: No   • Sexual activity: Defer   Social History Narrative    Single, children ×2       FAMILY HISTORY  Family History   Problem Relation Age of Onset   • Stroke Mother    • Heart disease Mother    • Heart disease Father

## 2019-09-13 DIAGNOSIS — M54.50 LUMBAR SPINE PAIN: ICD-10-CM

## 2019-09-13 DIAGNOSIS — R26.9 ABNORMALITY OF GAIT: ICD-10-CM

## 2019-09-13 DIAGNOSIS — R29.898 WEAKNESS OF BOTH LOWER EXTREMITIES: Primary | ICD-10-CM

## 2019-09-17 ENCOUNTER — APPOINTMENT (OUTPATIENT)
Dept: CT IMAGING | Facility: HOSPITAL | Age: 49
End: 2019-09-17

## 2019-09-17 ENCOUNTER — HOSPITAL ENCOUNTER (EMERGENCY)
Facility: HOSPITAL | Age: 49
Discharge: HOME | End: 2019-09-17
Attending: EMERGENCY MEDICINE | Admitting: EMERGENCY MEDICINE

## 2019-09-17 ENCOUNTER — APPOINTMENT (OUTPATIENT)
Dept: GENERAL RADIOLOGY | Facility: HOSPITAL | Age: 49
End: 2019-09-17

## 2019-09-17 VITALS
TEMPERATURE: 97.4 F | WEIGHT: 315 LBS | OXYGEN SATURATION: 97 % | HEART RATE: 97 BPM | HEIGHT: 70 IN | BODY MASS INDEX: 45.1 KG/M2 | RESPIRATION RATE: 13 BRPM | SYSTOLIC BLOOD PRESSURE: 139 MMHG | DIASTOLIC BLOOD PRESSURE: 89 MMHG

## 2019-09-17 DIAGNOSIS — I10 HYPERTENSION, UNSPECIFIED TYPE: ICD-10-CM

## 2019-09-17 DIAGNOSIS — R51.9 NONINTRACTABLE HEADACHE, UNSPECIFIED CHRONICITY PATTERN, UNSPECIFIED HEADACHE TYPE: Primary | ICD-10-CM

## 2019-09-17 LAB
ALBUMIN SERPL-MCNC: 4.6 G/DL (ref 3.5–5.2)
ALBUMIN/GLOB SERPL: 1 G/DL
ALP SERPL-CCNC: 63 U/L (ref 39–117)
ALT SERPL W P-5'-P-CCNC: 9 U/L (ref 1–41)
ANION GAP SERPL CALCULATED.3IONS-SCNC: 13.9 MMOL/L (ref 5–15)
AST SERPL-CCNC: 17 U/L (ref 1–40)
BASOPHILS # BLD AUTO: 0.05 10*3/MM3 (ref 0–0.2)
BASOPHILS NFR BLD AUTO: 0.7 % (ref 0–1.5)
BILIRUB SERPL-MCNC: 0.6 MG/DL (ref 0.2–1.2)
BILIRUB UR QL STRIP: NEGATIVE
BUN BLD-MCNC: 9 MG/DL (ref 6–20)
BUN/CREAT SERPL: 11.8 (ref 7–25)
CALCIUM SPEC-SCNC: 9.8 MG/DL (ref 8.6–10.5)
CHLORIDE SERPL-SCNC: 92 MMOL/L (ref 98–107)
CLARITY UR: CLEAR
CO2 SERPL-SCNC: 24.1 MMOL/L (ref 22–29)
COLOR UR: YELLOW
CREAT BLD-MCNC: 0.76 MG/DL (ref 0.76–1.27)
DEPRECATED RDW RBC AUTO: 42.4 FL (ref 37–54)
EOSINOPHIL # BLD AUTO: 0.04 10*3/MM3 (ref 0–0.4)
EOSINOPHIL NFR BLD AUTO: 0.6 % (ref 0.3–6.2)
ERYTHROCYTE [DISTWIDTH] IN BLOOD BY AUTOMATED COUNT: 13.1 % (ref 12.3–15.4)
GFR SERPL CREATININE-BSD FRML MDRD: 132 ML/MIN/1.73
GLOBULIN UR ELPH-MCNC: 4.7 GM/DL
GLUCOSE BLD-MCNC: 112 MG/DL (ref 65–99)
GLUCOSE UR STRIP-MCNC: NEGATIVE MG/DL
HCT VFR BLD AUTO: 51.7 % (ref 37.5–51)
HGB BLD-MCNC: 16.5 G/DL (ref 13–17.7)
HGB UR QL STRIP.AUTO: NEGATIVE
HOLD SPECIMEN: NORMAL
HOLD SPECIMEN: NORMAL
IMM GRANULOCYTES # BLD AUTO: 0.02 10*3/MM3 (ref 0–0.05)
IMM GRANULOCYTES NFR BLD AUTO: 0.3 % (ref 0–0.5)
KETONES UR QL STRIP: ABNORMAL
LEUKOCYTE ESTERASE UR QL STRIP.AUTO: NEGATIVE
LYMPHOCYTES # BLD AUTO: 2.1 10*3/MM3 (ref 0.7–3.1)
LYMPHOCYTES NFR BLD AUTO: 30.7 % (ref 19.6–45.3)
MCH RBC QN AUTO: 28.2 PG (ref 26.6–33)
MCHC RBC AUTO-ENTMCNC: 31.9 G/DL (ref 31.5–35.7)
MCV RBC AUTO: 88.2 FL (ref 79–97)
MONOCYTES # BLD AUTO: 0.55 10*3/MM3 (ref 0.1–0.9)
MONOCYTES NFR BLD AUTO: 8 % (ref 5–12)
NEUTROPHILS # BLD AUTO: 4.09 10*3/MM3 (ref 1.7–7)
NEUTROPHILS NFR BLD AUTO: 59.7 % (ref 42.7–76)
NITRITE UR QL STRIP: NEGATIVE
NRBC BLD AUTO-RTO: 0 /100 WBC (ref 0–0.2)
PH UR STRIP.AUTO: 7.5 [PH] (ref 5–8)
PLATELET # BLD AUTO: 219 10*3/MM3 (ref 140–450)
PMV BLD AUTO: 10.5 FL (ref 6–12)
POTASSIUM BLD-SCNC: 3.8 MMOL/L (ref 3.5–5.2)
PROT SERPL-MCNC: 9.3 G/DL (ref 6–8.5)
PROT UR QL STRIP: NEGATIVE
RBC # BLD AUTO: 5.86 10*6/MM3 (ref 4.14–5.8)
SODIUM BLD-SCNC: 130 MMOL/L (ref 136–145)
SP GR UR STRIP: 1.02 (ref 1–1.03)
TROPONIN T SERPL-MCNC: <0.01 NG/ML (ref 0–0.03)
UROBILINOGEN UR QL STRIP: ABNORMAL
WBC NRBC COR # BLD: 6.85 10*3/MM3 (ref 3.4–10.8)
WHOLE BLOOD HOLD SPECIMEN: NORMAL
WHOLE BLOOD HOLD SPECIMEN: NORMAL

## 2019-09-17 PROCEDURE — 93005 ELECTROCARDIOGRAM TRACING: CPT

## 2019-09-17 PROCEDURE — 70450 CT HEAD/BRAIN W/O DYE: CPT

## 2019-09-17 PROCEDURE — 81003 URINALYSIS AUTO W/O SCOPE: CPT | Performed by: PHYSICIAN ASSISTANT

## 2019-09-17 PROCEDURE — 71046 X-RAY EXAM CHEST 2 VIEWS: CPT

## 2019-09-17 PROCEDURE — 93010 ELECTROCARDIOGRAM REPORT: CPT | Performed by: INTERNAL MEDICINE

## 2019-09-17 PROCEDURE — 93005 ELECTROCARDIOGRAM TRACING: CPT | Performed by: EMERGENCY MEDICINE

## 2019-09-17 PROCEDURE — 84484 ASSAY OF TROPONIN QUANT: CPT | Performed by: PHYSICIAN ASSISTANT

## 2019-09-17 PROCEDURE — 80053 COMPREHEN METABOLIC PANEL: CPT | Performed by: PHYSICIAN ASSISTANT

## 2019-09-17 PROCEDURE — 99284 EMERGENCY DEPT VISIT MOD MDM: CPT

## 2019-09-17 PROCEDURE — 85025 COMPLETE CBC W/AUTO DIFF WBC: CPT | Performed by: PHYSICIAN ASSISTANT

## 2019-09-17 RX ORDER — IBUPROFEN 800 MG/1
800 TABLET ORAL ONCE
Status: COMPLETED | OUTPATIENT
Start: 2019-09-17 | End: 2019-09-17

## 2019-09-17 RX ORDER — ONDANSETRON 4 MG/1
4 TABLET, ORALLY DISINTEGRATING ORAL ONCE
Status: COMPLETED | OUTPATIENT
Start: 2019-09-17 | End: 2019-09-17

## 2019-09-17 RX ORDER — BUTALBITAL, ACETAMINOPHEN AND CAFFEINE 50; 325; 40 MG/1; MG/1; MG/1
2 TABLET ORAL ONCE
Status: COMPLETED | OUTPATIENT
Start: 2019-09-17 | End: 2019-09-17

## 2019-09-17 RX ADMIN — BUTALBITAL, ACETAMINOPHEN, AND CAFFEINE 2 TABLET: 50; 325; 40 TABLET ORAL at 08:58

## 2019-09-17 RX ADMIN — ONDANSETRON 4 MG: 4 TABLET, ORALLY DISINTEGRATING ORAL at 09:02

## 2019-09-17 RX ADMIN — IBUPROFEN 800 MG: 800 TABLET, FILM COATED ORAL at 11:26

## 2019-09-17 NOTE — ED PROVIDER NOTES
EMERGENCY DEPARTMENT ENCOUNTER    CHIEF COMPLAINT  Chief Complaint: ***  History given by: ***  History limited by: ***  Room Number: 21/21  PMD: Aliyah Flores APRN      HPI:  Pt is a 49 y.o. male who presents complaining of ***    Duration:  ***  Onset: ***  Timing: ***  Location: ***  Radiation: ***  Quality: ***  Intensity/Severity: ***  Progression: ***  Associated Symptoms: ***  Aggravating Factors: ***  Alleviating Factors: ***  Previous Episodes: ***  Treatment before arrival: ***    PAST MEDICAL HISTORY  Active Ambulatory Problems     Diagnosis Date Noted   • Hypertension 08/23/2016   • Hep C w/o coma, chronic (CMS/HCC) 08/23/2016   • Encounter for immunization 08/23/2016   • Dermatitis 08/23/2016   • Back pain 08/23/2016   • Abnormal transaminases 08/30/2016   • Obesity 10/24/2016   • Erectile dysfunction 02/24/2017   • Weakness of both lower extremities 04/15/2019   • Liver mass 04/15/2019   • HTN (hypertension) 04/15/2019   • Hepatitis C 04/15/2019   • Secondary biliary cirrhosis (CMS/HCC) 05/28/2019   • Abnormal CT of liver 05/28/2019     Resolved Ambulatory Problems     Diagnosis Date Noted   • No Resolved Ambulatory Problems     Past Medical History:   Diagnosis Date   • Back pain    • Hep C w/o coma, chronic (CMS/HCC)    • Hypertension        PAST SURGICAL HISTORY  Past Surgical History:   Procedure Laterality Date   • WISDOM TOOTH EXTRACTION         FAMILY HISTORY  Family History   Problem Relation Age of Onset   • Stroke Mother    • Heart disease Mother    • Heart disease Father        SOCIAL HISTORY  Social History     Socioeconomic History   • Marital status: Single     Spouse name: Not on file   • Number of children: Not on file   • Years of education: Not on file   • Highest education level: Not on file   Occupational History   • Occupation: Mental health associate   Tobacco Use   • Smoking status: Heavy Tobacco Smoker     Packs/day: 1.00     Years: 6.00     Pack years: 6.00     Types: Cigars      Start date: 2010   • Smokeless tobacco: Never Used   • Tobacco comment: Pt declined medication at this time.   Substance and Sexual Activity   • Alcohol use: No   • Sexual activity: Defer   Social History Narrative    Single, children ×2       ALLERGIES  Patient has no known allergies.    REVIEW OF SYSTEMS  Review of Systems    PHYSICAL EXAM  ED Triage Vitals   Temp Heart Rate Resp BP SpO2   09/17/19 0725 09/17/19 0725 09/17/19 0725 09/17/19 0728 09/17/19 0725   97 °F (36.1 °C) 119 18 (!) 159/112 97 %      Temp src Heart Rate Source Patient Position BP Location FiO2 (%)   09/17/19 0725 -- -- -- --   Tympanic           Physical Exam    LAB RESULTS  Lab Results (last 24 hours)     ** No results found for the last 24 hours. **          I ordered the above labs and reviewed the results    RADIOLOGY  No orders to display        I ordered the above noted radiological studies. Interpreted by radiologist. Discussed with radiologist (***). Reviewed by me in PACS.       PROCEDURES  Procedures      PROGRESS AND CONSULTS           MEDICAL DECISION MAKING  Results were reviewed/discussed with the patient and they were also made aware of online access. Pt also made aware that some labs, such as cultures, will not be resulted during ER visit and follow up with PMD is necessary.     MDM       DIAGNOSIS  Final diagnoses:   None       DISPOSITION  ***    Latest Documented Vital Signs:  As of 8:03 AM  BP- (!) 159/112 HR- 119 Temp- 97 °F (36.1 °C) (Tympanic) O2 sat- 97%    --  Documentation assistance provided by scribe *** for ***.  Information recorded by the scribe was done at my direction and has been verified and validated by me.     Kelly Flores  09/17/19 0803

## 2019-09-17 NOTE — ED PROVIDER NOTES
EMERGENCY DEPARTMENT ENCOUNTER    Room Number:  21/21  Date seen:  9/17/2019  Time seen: 8:17 AM  PCP: Aliyah Flores APRN    HPI:  Chief complaint: headache  Context:Marlon Longoria is a 49 y.o. male who presents to the ED with c/o elevated blood pressure that he noticed yesterday morning when he was screened before his session at [facility]. The patient reports he was found to have a blood pressure of 148/110 with a HR of 114, so he went home and then took his blood pressure medication (Norvasc) as directed. He has not taken it in over a week, but he does have plenty at home. The patient reports he then developed a frontal headache and nausea yesterday evening, so he went to [facility] earlier today to have his blood pressure checked and was noted to have an elevated blood pressure at 157/119, despite taking his blood pressure medication around 0600 this morning. The patient describes the headache as a throbbing sensation. The patient reports he has a hx of migraines. The patient reports his current headache is less severe than his typical migraines. The patient reports his headache is also similar in character to the headaches he has had in the past. The patient also complains of associated malaise but denies fever, chills, vomiting, chest pain, shortness of breath, sonophobia, photophobia, diarrhea, dysuria, hematuria, or any other urinary symptoms. The patient reports he has a MRI of his brain scheduled for the chronic paresthesia in his bilateral lower extremities. There are no other complaints at this time.    The patient arrived to the ED with a blood pressure of 135/97.    Onset: gradual  Location: frontal aspect of the head  Radiation: none specified  Duration: since yesterday evening  Timing: constant  Character: throbbing pain  Aggravating Factors: none specified  Alleviating Factors: none specified  Severity: moderate      ALLERGIES  Patient has no known allergies.    PAST MEDICAL HISTORY  Active Ambulatory  Problems     Diagnosis Date Noted   • Hypertension 08/23/2016   • Hep C w/o coma, chronic (CMS/HCC) 08/23/2016   • Encounter for immunization 08/23/2016   • Dermatitis 08/23/2016   • Back pain 08/23/2016   • Abnormal transaminases 08/30/2016   • Obesity 10/24/2016   • Erectile dysfunction 02/24/2017   • Weakness of both lower extremities 04/15/2019   • Liver mass 04/15/2019   • HTN (hypertension) 04/15/2019   • Hepatitis C 04/15/2019   • Secondary biliary cirrhosis (CMS/HCC) 05/28/2019   • Abnormal CT of liver 05/28/2019     Resolved Ambulatory Problems     Diagnosis Date Noted   • No Resolved Ambulatory Problems     Past Medical History:   Diagnosis Date   • Back pain    • Hep C w/o coma, chronic (CMS/HCC)    • Hypertension        PAST SURGICAL HISTORY  Past Surgical History:   Procedure Laterality Date   • WISDOM TOOTH EXTRACTION         FAMILY HISTORY  Family History   Problem Relation Age of Onset   • Stroke Mother    • Heart disease Mother    • Heart disease Father        SOCIAL HISTORY  Social History     Socioeconomic History   • Marital status: Single     Spouse name: Not on file   • Number of children: Not on file   • Years of education: Not on file   • Highest education level: Not on file   Occupational History   • Occupation: Mental health associate   Tobacco Use   • Smoking status: Heavy Tobacco Smoker     Packs/day: 1.00     Years: 6.00     Pack years: 6.00     Types: Cigars     Start date: 2010   • Smokeless tobacco: Never Used   • Tobacco comment: Pt declined medication at this time.   Substance and Sexual Activity   • Alcohol use: No   • Sexual activity: Defer   Social History Narrative    Single, children ×2       REVIEW OF SYSTEMS  Review of Systems   Constitutional: Negative for chills and fever.        Positive for malaise   HENT:        Negative for sonophobia   Eyes: Negative.  Negative for photophobia.   Respiratory: Negative for shortness of breath.    Cardiovascular: Negative for chest pain.    Gastrointestinal: Positive for nausea. Negative for abdominal pain and vomiting.   Genitourinary: Negative.  Negative for dysuria and hematuria.   Musculoskeletal: Negative.    Skin: Negative.    Neurological: Positive for numbness (chronic paresthesia in his bilateral lower extremities) and headaches (frontal).   Psychiatric/Behavioral: Negative.        PHYSICAL EXAM  ED Triage Vitals   Temp Heart Rate Resp BP SpO2   09/17/19 0725 09/17/19 0725 09/17/19 0725 09/17/19 0728 09/17/19 0725   97 °F (36.1 °C) 119 18 (!) 159/112 97 %      Temp src Heart Rate Source Patient Position BP Location FiO2 (%)   09/17/19 0725 -- -- -- --   Tympanic         Physical Exam   Constitutional: He is oriented to person, place, and time and well-developed, well-nourished, and in no distress.   HENT:   Head: Normocephalic and atraumatic.   Right Ear: Tympanic membrane and external ear normal.   Left Ear: Tympanic membrane and external ear normal.   Nose: Nose normal.   Mouth/Throat: Mucous membranes are normal.   Eyes: Conjunctivae and EOM are normal. Pupils are equal, round, and reactive to light.   Neck: Normal range of motion. No Brudzinski's sign and no Kernig's sign noted.   Cardiovascular: Normal rate and regular rhythm.   Pulmonary/Chest: Effort normal and breath sounds normal. He has no decreased breath sounds. He has no wheezes. He has no rhonchi. He has no rales.   Abdominal: Soft. He exhibits no distension. There is no tenderness.   Musculoskeletal: Normal range of motion.   Lymphadenopathy:     He has no cervical adenopathy.   Neurological: He is alert and oriented to person, place, and time.   He has a nonfocal neuro exam.   Skin: Skin is warm and dry.   Psychiatric: Affect normal.   Nursing note and vitals reviewed.      LAB RESULTS  Recent Results (from the past 24 hour(s))   Light Blue Top    Collection Time: 09/17/19  8:20 AM   Result Value Ref Range    Extra Tube hold for add-on    Green Top (Gel)    Collection Time:  09/17/19  8:20 AM   Result Value Ref Range    Extra Tube Hold for add-ons.    Lavender Top    Collection Time: 09/17/19  8:20 AM   Result Value Ref Range    Extra Tube hold for add-on    Gold Top - SST    Collection Time: 09/17/19  8:20 AM   Result Value Ref Range    Extra Tube Hold for add-ons.    Comprehensive Metabolic Panel    Collection Time: 09/17/19  8:20 AM   Result Value Ref Range    Glucose 112 (H) 65 - 99 mg/dL    BUN 9 6 - 20 mg/dL    Creatinine 0.76 0.76 - 1.27 mg/dL    Sodium 130 (L) 136 - 145 mmol/L    Potassium 3.8 3.5 - 5.2 mmol/L    Chloride 92 (L) 98 - 107 mmol/L    CO2 24.1 22.0 - 29.0 mmol/L    Calcium 9.8 8.6 - 10.5 mg/dL    Total Protein 9.3 (H) 6.0 - 8.5 g/dL    Albumin 4.60 3.50 - 5.20 g/dL    ALT (SGPT) 9 1 - 41 U/L    AST (SGOT) 17 1 - 40 U/L    Alkaline Phosphatase 63 39 - 117 U/L    Total Bilirubin 0.6 0.2 - 1.2 mg/dL    eGFR  African Amer 132 >60 mL/min/1.73    Globulin 4.7 gm/dL    A/G Ratio 1.0 g/dL    BUN/Creatinine Ratio 11.8 7.0 - 25.0    Anion Gap 13.9 5.0 - 15.0 mmol/L   Troponin    Collection Time: 09/17/19  8:20 AM   Result Value Ref Range    Troponin T <0.010 0.000 - 0.030 ng/mL   CBC Auto Differential    Collection Time: 09/17/19  8:20 AM   Result Value Ref Range    WBC 6.85 3.40 - 10.80 10*3/mm3    RBC 5.86 (H) 4.14 - 5.80 10*6/mm3    Hemoglobin 16.5 13.0 - 17.7 g/dL    Hematocrit 51.7 (H) 37.5 - 51.0 %    MCV 88.2 79.0 - 97.0 fL    MCH 28.2 26.6 - 33.0 pg    MCHC 31.9 31.5 - 35.7 g/dL    RDW 13.1 12.3 - 15.4 %    RDW-SD 42.4 37.0 - 54.0 fl    MPV 10.5 6.0 - 12.0 fL    Platelets 219 140 - 450 10*3/mm3    Neutrophil % 59.7 42.7 - 76.0 %    Lymphocyte % 30.7 19.6 - 45.3 %    Monocyte % 8.0 5.0 - 12.0 %    Eosinophil % 0.6 0.3 - 6.2 %    Basophil % 0.7 0.0 - 1.5 %    Immature Grans % 0.3 0.0 - 0.5 %    Neutrophils, Absolute 4.09 1.70 - 7.00 10*3/mm3    Lymphocytes, Absolute 2.10 0.70 - 3.10 10*3/mm3    Monocytes, Absolute 0.55 0.10 - 0.90 10*3/mm3    Eosinophils, Absolute 0.04  0.00 - 0.40 10*3/mm3    Basophils, Absolute 0.05 0.00 - 0.20 10*3/mm3    Immature Grans, Absolute 0.02 0.00 - 0.05 10*3/mm3    nRBC 0.0 0.0 - 0.2 /100 WBC   Urinalysis With Microscopic If Indicated (No Culture) - Urine, Clean Catch    Collection Time: 09/17/19  9:41 AM   Result Value Ref Range    Color, UA Yellow Yellow, Straw    Appearance, UA Clear Clear    pH, UA 7.5 5.0 - 8.0    Specific Gravity, UA 1.019 1.005 - 1.030    Glucose, UA Negative Negative    Ketones, UA Trace (A) Negative    Bilirubin, UA Negative Negative    Blood, UA Negative Negative    Protein, UA Negative Negative    Leuk Esterase, UA Negative Negative    Nitrite, UA Negative Negative    Urobilinogen, UA 1.0 E.U./dL 0.2 - 1.0 E.U./dL       I ordered the above labs and reviewed the results    RADIOLOGY  Xr Chest 2 View    Result Date: 9/17/2019  Narrative: XR CHEST 2 VW-  Clinical: Hypertension with generalized weakness  COMPARISON: None  FINDINGS: Shallow inspiratory effort. No edema, effusion or active airspace disease is demonstrated. Cardiomediastinal silhouette is satisfactory in appearance. There are monitoring leads superimposing the chest.  CONCLUSION: Shallow inspiratory effort, no acute cardiovascular or pulmonary process is demonstrated.  This report was finalized on 9/17/2019 9:05 AM by Dr. Satnam Encinas M.D.      Ct Head Without Contrast    Result Date: 9/17/2019  Narrative: CT HEAD WO CONTRAST-  CLINICAL HISTORY: Headache. Hypertension.  TECHNIQUE: Transverse 3 mm thick images were acquired from the base of skull to vertex without IV contrast.  Radiation dose reduction techniques were utilized, including automated exposure control and exposure modulation based on body size.  COMPARISON: None  FINDINGS: The brain and ventricular system appear structurally within normal limits for patient of this age. There is no evidence recent or old infarct or hemorrhage. There are no masses. The visualized paranasal sinuses appear well aerated.   "    Impression: Normal CT scan of the head without contrast  This report was finalized on 9/17/2019 9:16 AM by Dr. Mukesh Tom M.D.          I ordered the above noted radiological studies and reviewed the images on the PACS system.    MEDICATIONS GIVEN IN ER  Medications   ibuprofen (ADVIL,MOTRIN) tablet 800 mg (not administered)   butalbital-acetaminophen-caffeine (FIORICET, ESGIC) -40 MG per tablet 2 tablet (2 tablets Oral Given 9/17/19 0858)   ondansetron ODT (ZOFRAN-ODT) disintegrating tablet 4 mg (4 mg Oral Given 9/17/19 0902)       EKG  Interpreted by ED Physician (Dr. Jamia Nathan)    PROCEDURES  Procedures      PROGRESS AND CONSULTS    Progress Notes:  0837 Ordered UA, blood work, CXR, EKG, and CT Head for further evaluation. Also ordered Fioricet to treat the patient's headache.    0859 Ordered Zofran for nausea.    1010 Reviewed pt's history and workup with Dr. Jamia Nathan MD (Emergency Medicine).  After a bedside evaluation; Dr. Nathan agrees with the plan of care.    1019 Rechecked the patient who still complains of a headache despite receiving Fioricet in the ER. Patient is stable. BP- 139/89 HR- 87 Temp- 97 °F (36.1 °C) (Tympanic) O2 sat- 97%. Informed the patient of his unremarkable UA and CT Head. Also informed the patient of his blood work which showed a Sodium of 130. Advised the patient that he needs to maintain adequate PO intake. Discussed the plan to treat the patient's pain with Ibuprofen prior to discharging him home with instructions to f/u with his PMD for further evaluation and management. Advised the patient to take his blood pressure medication as directed. Strict RTER warnings given. Pt understands and agrees with the plan, all questions answered.    1042 Ordered Ibuprofen for pain management.    Disposition vitals:  /89   Pulse 101   Temp 97 °F (36.1 °C) (Tympanic)   Resp 15   Ht 177.8 cm (70\")   Wt (!) 145 kg (320 lb)   SpO2 97%   BMI 45.92 kg/m²       DIAGNOSIS  Final " diagnoses:   Nonintractable headache, unspecified chronicity pattern, unspecified headache type   Hypertension, unspecified type       FOLLOW UP   Aliyah Flores, APRN  [address] [address] [phone]    Schedule an appointment as soon as possible for a visit         RX     Medication List      No changes were made to your prescriptions during this visit.           Documentation assistance provided by rick Stover for Omaira Calderon PA-C.  Information recorded by the scribe was done at my direction and has been verified and validated by me.         Sho Stover  09/17/19 1121       Omaira Calderon PA  09/19/19 [id number]

## 2019-09-17 NOTE — ED PROVIDER NOTES
The URVASHI and I have discussed this patient's history, physical exam, and treatment plan. I have reviewed the documentation and personally had a face to face interaction with the patient  I affirm the documentation and agree with the treatment and plan.  The following describes my personal findings.    The patient with a h/o HTN presents complaining of elevated blood pressure that he noticed yesterday while at [facility]. His BP was 148/110 at that time. He went to urgent care today and his BP was 157/119. Pt reports taking his BP medication at 6:00AM. He c/o associated mild HA and nausea earlier today, now improved. Pt has chronic BLE pain and weakness. He denies fever, CP, SOA, and new numbness/incontinence and new weakness.     Limited physical exam:  Patient is nontoxic appearing, awake, alert, oriented  Lungs/cardiovascular: CTAB, RRR, non-tachycardic in the 90s  /89 during exam  Abdomen: soft, non tender, positive bowel sounds  Back/extremities: non tender, posterior tibial pulses intact bilateral lower extremities, no edema, patient able to lift legs off of the bed in plantar and dorsiflex his feet bilaterally    MDM-  Reviewed PCP Aliyah Flores's notes from this month. Pt had negative C, T, and L spine CTs, EMG, and vascular evaluation in the last 6 months.         Documentation assistance provided by rick Flores.  Information recorded by the scribe was done at my direction and has been verified and validated by me.         Kelly Flores  09/17/19 4022       Jamia Nathan MD  09/19/19 [id number]

## 2019-09-17 NOTE — ED NOTES
"Pt states \"Yesterday my blood pressure was high, it was 148/107. I went home and took my blood pressure medicine. I took more an hour later and then took it again at 6 this morning. My head is still hurting and I'm nauseating. I went to an [facility] this morning and it was still high, like 157/119.\"     [address]ma [address], RN  09/17/19 0722    "

## 2019-09-17 NOTE — DISCHARGE INSTRUCTIONS
Take your blood pressure every day.  Follow up with your primary care doctor for a recheck in 2 days.  Return to the ER as needed.

## 2019-09-25 ENCOUNTER — TELEPHONE (OUTPATIENT)
Dept: INTERNAL MEDICINE | Age: 49
End: 2019-09-25

## 2019-09-25 NOTE — TELEPHONE ENCOUNTER
FYI:    Pt states the nerve pain in his legs has declined immensely x 7 days now at bedtime. Knee pain also has declined but still has some numbness when sitting; however in the standing position has pressure & stiffness until he begins walking & moving his joints.    Pt states he has been using the cane 80% of the time x 3 days vs the walker this week.     Pt states he has an MRI of the brain scheduled for Friday, 9/27/19 at [facility]. He is complaining of being claustrophobic but understands that there is nothing he can take OTC to prevent his fear.    Pt can be reached #[phone].

## 2019-09-26 NOTE — TELEPHONE ENCOUNTER
Can I request a one time dose of Xanax pre-MRI for this patient please? Thank you. Scheduled for tomorrow.

## 2019-09-27 ENCOUNTER — HOSPITAL ENCOUNTER (OUTPATIENT)
Dept: MRI IMAGING | Facility: HOSPITAL | Age: 49
Discharge: HOME | End: 2019-09-27
Admitting: NURSE PRACTITIONER

## 2019-09-27 PROCEDURE — 70553 MRI BRAIN STEM W/O & W/DYE: CPT

## 2019-09-27 PROCEDURE — A9577 INJ MULTIHANCE: HCPCS | Performed by: NURSE PRACTITIONER

## 2019-09-27 PROCEDURE — 0 GADOBENATE DIMEGLUMINE 529 MG/ML SOLUTION: Performed by: NURSE PRACTITIONER

## 2019-09-27 RX ADMIN — GADOBENATE DIMEGLUMINE 20 ML: 529 INJECTION, SOLUTION INTRAVENOUS at 07:25

## 2019-10-01 ENCOUNTER — OFFICE VISIT (OUTPATIENT)
Dept: NEUROLOGY | Facility: CLINIC | Age: 49
End: 2019-10-01

## 2019-10-01 VITALS
SYSTOLIC BLOOD PRESSURE: 150 MMHG | WEIGHT: 315 LBS | HEART RATE: 83 BPM | BODY MASS INDEX: 45.1 KG/M2 | OXYGEN SATURATION: 98 % | HEIGHT: 70 IN | DIASTOLIC BLOOD PRESSURE: 105 MMHG

## 2019-10-01 DIAGNOSIS — G89.29 CHRONIC BILATERAL LOW BACK PAIN WITH BILATERAL SCIATICA: ICD-10-CM

## 2019-10-01 DIAGNOSIS — G89.29 CHRONIC BILATERAL THORACIC BACK PAIN: ICD-10-CM

## 2019-10-01 DIAGNOSIS — M54.6 CHRONIC BILATERAL THORACIC BACK PAIN: ICD-10-CM

## 2019-10-01 DIAGNOSIS — R29.898 WEAKNESS OF BOTH LOWER EXTREMITIES: Primary | ICD-10-CM

## 2019-10-01 DIAGNOSIS — R20.0 LOWER EXTREMITY NUMBNESS: ICD-10-CM

## 2019-10-01 DIAGNOSIS — M54.42 CHRONIC BILATERAL LOW BACK PAIN WITH BILATERAL SCIATICA: ICD-10-CM

## 2019-10-01 DIAGNOSIS — M54.41 CHRONIC BILATERAL LOW BACK PAIN WITH BILATERAL SCIATICA: ICD-10-CM

## 2019-10-01 PROCEDURE — 99244 OFF/OP CNSLTJ NEW/EST MOD 40: CPT | Performed by: PSYCHIATRY & NEUROLOGY
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
## 2019-10-02 ENCOUNTER — HOSPITAL ENCOUNTER (OUTPATIENT)
Dept: PHYSICAL THERAPY | Facility: HOSPITAL | Age: 49
Setting detail: THERAPIES SERIES
Discharge: HOME | End: 2019-10-02

## 2019-10-02 DIAGNOSIS — R26.9 GAIT ABNORMALITY: ICD-10-CM

## 2019-10-02 DIAGNOSIS — M54.40 CHRONIC BILATERAL LOW BACK PAIN WITH SCIATICA, SCIATICA LATERALITY UNSPECIFIED: ICD-10-CM

## 2019-10-02 DIAGNOSIS — R29.898 WEAKNESS OF BOTH LOWER EXTREMITIES: Primary | ICD-10-CM

## 2019-10-02 DIAGNOSIS — G89.29 CHRONIC BILATERAL LOW BACK PAIN WITH SCIATICA, SCIATICA LATERALITY UNSPECIFIED: ICD-10-CM

## 2019-10-02 PROCEDURE — 97112 NEUROMUSCULAR REEDUCATION: CPT

## 2019-10-02 PROCEDURE — 97530 THERAPEUTIC ACTIVITIES: CPT

## 2019-10-02 NOTE — THERAPY PROGRESS REPORT/RE-CERT
Outpatient Physical Therapy Ortho Progress Note  [facility]     Patient Name: Marlon Longoria  : 1970  MRN: [id number]  Today's Date: 10/2/2019      Visit Date: 10/02/2019    Visit Dx:    ICD-10-CM ICD-9-CM   1. Weakness of both lower extremities R29.898 729.89   2. Chronic bilateral low back pain with sciatica, sciatica laterality unspecified M54.40 724.2    G89.29 724.3     338.29   3. Gait abnormality R26.9 781.2       Patient Active Problem List   Diagnosis   • Hypertension   • Hep C w/o coma, chronic (CMS/HCC)   • Encounter for immunization   • Dermatitis   • Back pain   • Abnormal transaminases   • Obesity   • Erectile dysfunction   • Weakness of both lower extremities   • Liver mass   • HTN (hypertension)   • Hepatitis C   • Secondary biliary cirrhosis (CMS/HCC)   • Abnormal CT of liver        Past Medical History:   Diagnosis Date   • Back pain    • Difficulty walking    • Hep C w/o coma, chronic (CMS/HCC)    • Hypertension         Past Surgical History:   Procedure Laterality Date   • WISDOM TOOTH EXTRACTION                         PT Assessment/Plan     Row Name 10/02/19 1100          PT Assessment    Functional Limitations  Decreased safety during functional activities;Impaired gait;Limitation in home management;Limitations in community activities;Limitations in functional capacity and performance;Performance in work activities;Performance in leisure activities  -RS     Impairments  Balance;Endurance;Gait;Muscle strength;Poor body mechanics;Posture;Sensation  -RS     Assessment Comments  Mr. Longoria presents to PT clinic with reports of 1 trip to ED secondary to high blood pressure, which is now controlled. His MD ordered an MRI on thoracic spine secondary to continued numbness. Updated pt goals, he has met all short term goals and demonstrates progress toward remaining goals, including stair climbing, 2 min walk test, and will be initiating program at the [facility]. Held further therex secondary to  "pt reports of needing shorter session to discuss long term disability paperwork with his MD. Secondary to pt poor compliance with schedule, scheduled 1 remaining session to review POC and discuss potentially scheduling another treatment session. Discussed recreational activity with  pt, reports will be initiating program at [facility] this week for improved mental and physical health.  -RS     Please refer to paper survey for additional self-reported information  Yes  -RS     Rehab Potential  Good  -RS     Patient/caregiver participated in establishment of treatment plan and goals  Yes  -RS     Patient would benefit from skilled therapy intervention  Yes  -RS        PT Plan    PT Plan Comments  Continue 1x a week for approx 4 weeks to facilitate IND with home/ gym program.  -RS       User Key  (r) = Recorded By, (t) = Taken By, (c) = Cosigned By    Initials Name Provider Type    RS Ekaterina Almendarez, PT Physical Therapist            OP Exercises     Row Name 10/02/19 1000             Subjective Pain    Able to rate subjective pain?  yes  -RS      Pre-Treatment Pain Level  0  -RS         Total Minutes    24173 -  PT Neuromuscular Reeducation Minutes  10  -RS      71730 - PT Therapeutic Activity Minutes  20  -RS         Exercise 1    Exercise Name 1  --  -RS      Time 1  --  -RS         Exercise 2    Exercise Name 2  High knee slow march  -RS      Reps 2  // bars 4 lengths  -RS      Additional Comments  intermittent hands  -RS         Exercise 3    Exercise Name 3  Tandem walk  -RS      Cueing 3  Demo  -RS      Reps 3  // bars 4 lengths  -RS      Time 3  fwd and retro intermittent hands  -RS         Exercise 4    Exercise Name 4  \"carioca\"  -RS      Reps 4  // bars 4 lengths  -RS      Additional Comments  interrmittent hands  -RS         Exercise 5    Exercise Name 5  gait with SPC  -RS      Additional Comments  4 min  -RS         Exercise 6    Exercise Name 6  --  -RS      Cueing 6  --  -RS      Sets 6  --  -RS      Reps 6 "  --  -RS         Exercise 7    Exercise Name 7  rhomberg  -RS      Sets 7  --  -RS      Reps 7  2  -RS      Time 7  30 seconds  -RS      Additional Comments  eyes closed  -RS         Exercise 8    Exercise Name 8  --  -RS         Exercise 9    Exercise Name 9  --  -RS      Sets 9  --  -RS      Reps 9  --  -RS      Time 9  --  -RS         Exercise 10    Exercise Name 10  --  -RS      Reps 10  --  -RS        User Key  (r) = Recorded By, (t) = Taken By, (c) = Cosigned By    Initials Name Provider Type    RS Ekaterina Almendarez, PT Physical Therapist                       PT OP Goals     Row Name 10/02/19 1000          PT Short Term Goals    STG Date to Achieve  08/06/19  -RS     STG 1  Patient will be independent with initial HEP.  -RS     STG 1 Progress  Met  -RS     STG 2  Patient will ambulate 100 ft with least resrictive AD and increasing elke.  -RS     STG 2 Progress  Partially Met  -RS     STG 3  Patient will deny falls.  -RS     STG 3 Progress  Met  -RS        Long Term Goals    LTG Date to Achieve  09/05/19  -RS     LTG 1  Patient will be independent with progressive HEP for long term management of current condition.  -RS     LTG 1 Progress  Ongoing;Progressing  -RS     LTG 2  Patient will score </= 40% disability on the modified ADRIENNE to indicate improved perceived performance with ADLs.  -RS     LTG 2 Progress  Progressing  -RS     LTG 2 Progress Comments  46% disability  -RS     LTG 3  Patient will return to reciprocal stair climbing with single rail for safety PRN.  -RS     LTG 3 Progress  Partially Met  -RS     LTG 3 Progress Comments  using cane and single handrail, can do reciprocal if stairs are shorter  -RS     LTG 4  Patient will demonstrate safe lifting/transfer mechanics for long term spine preservation and return to work.  -RS     LTG 4 Progress  Ongoing  -RS     LTG 5  Pt will ambulate 250 feet in 2 min for improved gait speed and community ambulation.  -RS     LTG 5 Progress  Goal Revised  -RS      LTG 5 Progress Comments  walks 180 feet  -RS     LTG 6  The pt will participate in recreational activities ([employer], class, aqua, etc) 3 days per week for improved functional activity tolerance and initiation of maitenance program.  -RS     LTG 6 Progress  New  -RS       User Key  (r) = Recorded By, (t) = Taken By, (c) = Cosigned By    Initials Name Provider Type    RS Ekaterina Almendarez, PT Physical Therapist               Outcome Measure Options: 2 Minute Walk Test, 5x Sit to Stand, Modifed Owestry  2 Minute Walk Test  Gait, Assistive Device: straight cane  Distance Ambulated in 2 Minutes: 180  5 Times Sit to Stand  5 Times Sit to Stand (seconds): 13 seconds  5 Times Sit to Stand Comments: leaning posteriorly on chair, using BUE  Modified Oswestry  Modified Oswestry Score/Comments: 46% disability      Time Calculation:   Start Time: 1045  Stop Time: 1120  Time Calculation (min): 35 min  Therapy Charges for Today     Code Description Service Date Service Provider Modifiers Qty    88252304877  PT NEUROMUSC RE EDUCATION EA 15 MIN 10/2/2019 Ekaterina Almendarez, PT GP 1    [id number]  PT THERAPEUTIC ACT EA 15 MIN 10/2/2019 Ekaterina Almendarez, PT GP 1          PT G-Codes  Outcome Measure Options: 2 Minute Walk Test, 5x Sit to Stand, Modifed Owestry  Modified Oswestry Score/Comments: 46% disability         Ekaterina Almendarez PT  10/2/2019

## 2019-10-09 ENCOUNTER — HOSPITAL ENCOUNTER (OUTPATIENT)
Dept: PHYSICAL THERAPY | Facility: HOSPITAL | Age: 49
Setting detail: THERAPIES SERIES
Discharge: HOME | End: 2019-10-09

## 2019-10-09 DIAGNOSIS — R29.898 WEAKNESS OF BOTH LOWER EXTREMITIES: Primary | ICD-10-CM

## 2019-10-09 DIAGNOSIS — M54.40 CHRONIC BILATERAL LOW BACK PAIN WITH SCIATICA, SCIATICA LATERALITY UNSPECIFIED: ICD-10-CM

## 2019-10-09 DIAGNOSIS — G89.29 CHRONIC BILATERAL LOW BACK PAIN WITH SCIATICA, SCIATICA LATERALITY UNSPECIFIED: ICD-10-CM

## 2019-10-09 DIAGNOSIS — R26.9 GAIT ABNORMALITY: ICD-10-CM

## 2019-10-09 PROCEDURE — 97530 THERAPEUTIC ACTIVITIES: CPT

## 2019-10-09 PROCEDURE — 97112 NEUROMUSCULAR REEDUCATION: CPT

## 2019-10-09 NOTE — THERAPY TREATMENT NOTE
Outpatient Physical Therapy Ortho Treatment Note  [facility]     Patient Name: Marlon Longoria  : 1970  MRN: [id number]  Today's Date: 10/9/2019      Visit Date: 10/09/2019    Visit Dx:    ICD-10-CM ICD-9-CM   1. Weakness of both lower extremities R29.898 729.89   2. Chronic bilateral low back pain with sciatica, sciatica laterality unspecified M54.40 724.2    G89.29 724.3     338.29   3. Gait abnormality R26.9 781.2       Patient Active Problem List   Diagnosis   • Hypertension   • Hep C w/o coma, chronic (CMS/HCC)   • Encounter for immunization   • Dermatitis   • Back pain   • Abnormal transaminases   • Obesity   • Erectile dysfunction   • Weakness of both lower extremities   • Liver mass   • HTN (hypertension)   • Hepatitis C   • Secondary biliary cirrhosis (CMS/HCC)   • Abnormal CT of liver        Past Medical History:   Diagnosis Date   • Back pain    • Difficulty walking    • Hep C w/o coma, chronic (CMS/HCC)    • Hypertension         Past Surgical History:   Procedure Laterality Date   • WISDOM TOOTH EXTRACTION                         PT Assessment/Plan     Row Name 10/09/19 1200          PT Assessment    Assessment Comments  Mr. Longoria reports has begun going to the [employer] to use the pool 3 days per week. He feels like he can already walk better. Noted improved gait with and without SPC compared to previous treatment session with decreased lateral trunk lean. Discussed POC with pt focusing on [employer] program in water for next 2-3 weeks prior to returning to PT to assess progress toward goals and potential DC from PT. Pt states he would like to not be using SPC and is practicing gait in the water without it.  -RS        PT Plan    PT Plan Comments  Follow up in 2-3 weeks to assess progress/ compliance with [employer], assess gait without SPC as appropriate.  -RS       User Key  (r) = Recorded By, (t) = Taken By, (c) = Cosigned By    Initials Name Provider Type    Ekaterina Augustin PT Physical Therapist  "           OP Exercises     Row Name 10/09/19 1200             Subjective Comments    Subjective Comments  Pt reports [facility] [employer] and the water flt good but the numbness returns after he sits or lays down.  -RS         Subjective Pain    Able to rate subjective pain?  yes  -RS      Pre-Treatment Pain Level  0  -RS         Total Minutes    56946 -  PT Neuromuscular Reeducation Minutes  15  -RS      79336 - PT Therapeutic Activity Minutes  18  -RS         Exercise 1    Exercise Name 1  Bike L5  -RS      Time 1  7 min  -RS         Exercise 2    Exercise Name 2  High knee slow march  -RS      Reps 2  // bars 4 lengths  -RS      Additional Comments  intermittent hands  -RS         Exercise 3    Exercise Name 3  Tandem walk  -RS      Cueing 3  Demo  -RS      Reps 3  // bars 4 lengths  -RS      Time 3  fwd and retro intermittent hands  -RS         Exercise 4    Exercise Name 4  \"carioca\"  -RS      Reps 4  // bars 4 lengths  -RS      Additional Comments  interrmittent hands  -RS         Exercise 5    Exercise Name 5  --  -RS         Exercise 7    Exercise Name 7  rhomberg  -RS      Reps 7  2  -RS      Time 7  30 seconds  -RS      Additional Comments  eyes closed  -RS         Exercise 8    Exercise Name 8  mini squat no hands  -RS      Reps 8  15  -RS         Exercise 9    Exercise Name 9  tandem  -RS      Sets 9  1  -RS      Reps 9  1  -RS      Time 9  30 seconds  -RS         Exercise 10    Exercise Name 10  sidestepping GTB at knees  -RS      Reps 10  4 lengths of // bars  -RS      Additional Comments  intermittent hands  -RS         Exercise 11    Exercise Name 11  aquatic exercise review  -RS        User Key  (r) = Recorded By, (t) = Taken By, (c) = Cosigned By    Initials Name Provider Type    RS Ekaterina Almendarez, PT Physical Therapist                       PT OP Goals     Row Name 10/09/19 1200          PT Short Term Goals    STG Date to Achieve  08/06/19  -RS     STG 1  Patient will be independent with " initial HEP.  -RS     STG 1 Progress  Met  -RS     STG 2  Patient will ambulate 100 ft with least resrictive AD and increasing elke.  -RS     STG 2 Progress  Met  -RS     STG 3  Patient will deny falls.  -RS     STG 3 Progress  Met  -RS        Long Term Goals    LTG Date to Achieve  09/05/19  -RS     LTG 1  Patient will be independent with progressive HEP for long term management of current condition.  -RS     LTG 1 Progress  Ongoing;Progressing  -RS     LTG 2  Patient will score </= 40% disability on the modified ADRIENNE to indicate improved perceived performance with ADLs.  -RS     LTG 2 Progress  Progressing  -RS     LTG 3  Patient will return to reciprocal stair climbing with single rail for safety PRN.  -RS     LTG 3 Progress  Partially Met  -RS     LTG 4  Patient will demonstrate safe lifting/transfer mechanics for long term spine preservation and return to work.  -RS     LTG 4 Progress  Ongoing  -RS     LTG 5  Pt will ambulate 250 feet in 2 min for improved gait speed and community ambulation.  -RS     LTG 5 Progress  Ongoing  -RS     LTG 6  The pt will participate in recreational activities ([employer], class, aqua, etc) 3 days per week for improved functional activity tolerance and initiation of maitenance program.  -RS     LTG 6 Progress  Ongoing  -RS       User Key  (r) = Recorded By, (t) = Taken By, (c) = Cosigned By    Initials Name Provider Type    RS Ekaterina Almendarez PT Physical Therapist                         Time Calculation:   Start Time: 1215  Stop Time: 1250  Time Calculation (min): 35 min  Therapy Charges for Today     Code Description Service Date Service Provider Modifiers Qty    89192389924  PT NEUROMUSC RE EDUCATION EA 15 MIN 10/9/2019 Ekaterina Almendarez, PT GP 1    [id number]  PT THERAPEUTIC ACT EA 15 MIN 10/9/2019 Ekaterina Almendarez, PT GP 1                    Ekaterina Almendarez PT  10/9/2019

## 2019-10-10 ENCOUNTER — TELEPHONE (OUTPATIENT)
Dept: NEUROLOGY | Facility: CLINIC | Age: 49
End: 2019-10-10

## 2019-10-10 NOTE — TELEPHONE ENCOUNTER
----- Message from Dorothy Mcbride sent at 10/10/2019  2:19 PM EDT -----  Contact: PT  Mr. Longoria has a MRI scheduled 10/25/19 he's claustrophobic, he says he needs something to calm him before the MRI.  Please call him at [phone].    ThanksAlis

## 2019-10-11 RX ORDER — DIAZEPAM 10 MG/1
TABLET ORAL
Qty: 1 TABLET | Refills: 0 | Status: SHIPPED | OUTPATIENT
Start: 2019-10-11 | End: 2019-10-22 | Stop reason: SDUPTHER

## 2019-10-11 NOTE — TELEPHONE ENCOUNTER
Please inform patient that I have called in Valium 10 mg to be taken 1 hour prior to MRI.  Thank you

## 2019-10-22 ENCOUNTER — HOSPITAL ENCOUNTER (OUTPATIENT)
Dept: MRI IMAGING | Facility: HOSPITAL | Age: 49
Discharge: HOME | End: 2019-10-22
Admitting: PSYCHIATRY & NEUROLOGY

## 2019-10-22 ENCOUNTER — TELEPHONE (OUTPATIENT)
Dept: INTERNAL MEDICINE | Age: 49
End: 2019-10-22

## 2019-10-22 ENCOUNTER — HOSPITAL ENCOUNTER (OUTPATIENT)
Dept: MRI IMAGING | Facility: HOSPITAL | Age: 49
Discharge: HOME | End: 2019-10-22

## 2019-10-22 DIAGNOSIS — I10 ESSENTIAL HYPERTENSION: ICD-10-CM

## 2019-10-22 DIAGNOSIS — G89.29 CHRONIC BILATERAL LOW BACK PAIN WITH BILATERAL SCIATICA: ICD-10-CM

## 2019-10-22 DIAGNOSIS — M54.42 CHRONIC BILATERAL LOW BACK PAIN WITH BILATERAL SCIATICA: ICD-10-CM

## 2019-10-22 DIAGNOSIS — M54.6 CHRONIC BILATERAL THORACIC BACK PAIN: ICD-10-CM

## 2019-10-22 DIAGNOSIS — G89.29 CHRONIC BILATERAL THORACIC BACK PAIN: ICD-10-CM

## 2019-10-22 DIAGNOSIS — F40.240 CLAUSTROPHOBIA: Primary | ICD-10-CM

## 2019-10-22 DIAGNOSIS — R20.0 LOWER EXTREMITY NUMBNESS: ICD-10-CM

## 2019-10-22 DIAGNOSIS — R29.898 WEAKNESS OF BOTH LOWER EXTREMITIES: ICD-10-CM

## 2019-10-22 DIAGNOSIS — M54.41 CHRONIC BILATERAL LOW BACK PAIN WITH BILATERAL SCIATICA: ICD-10-CM

## 2019-10-22 PROCEDURE — 82565 ASSAY OF CREATININE: CPT

## 2019-10-22 PROCEDURE — A9577 INJ MULTIHANCE: HCPCS | Performed by: PSYCHIATRY & NEUROLOGY

## 2019-10-22 PROCEDURE — 0 GADOBENATE DIMEGLUMINE 529 MG/ML SOLUTION: Performed by: PSYCHIATRY & NEUROLOGY

## 2019-10-22 PROCEDURE — 72158 MRI LUMBAR SPINE W/O & W/DYE: CPT

## 2019-10-22 RX ORDER — AMLODIPINE BESYLATE 10 MG/1
10 TABLET ORAL DAILY
Qty: 30 TABLET | Refills: 3 | Status: SHIPPED | OUTPATIENT
Start: 2019-10-22

## 2019-10-22 RX ORDER — DIAZEPAM 10 MG/1
TABLET ORAL
Qty: 1 TABLET | Refills: 0 | Status: SHIPPED | OUTPATIENT
Start: 2019-10-22 | End: 2019-11-12

## 2019-10-22 RX ADMIN — GADOBENATE DIMEGLUMINE 20 ML: 529 INJECTION, SOLUTION INTRAVENOUS at 18:22

## 2019-10-22 NOTE — TELEPHONE ENCOUNTER
Pt is having 2-MRI's performed today starting at 4:30p.    Pt is requesting a rx for Valium 10mg.    Also is requesting a rx refill of amlodipine 10mg.    [id number].    Pt can be [phone] #[phone].

## 2019-10-23 LAB — CREAT BLDA-MCNC: 0.6 MG/DL (ref 0.6–1.3)

## 2019-10-28 ENCOUNTER — HOSPITAL ENCOUNTER (OUTPATIENT)
Dept: PHYSICAL THERAPY | Facility: HOSPITAL | Age: 49
Setting detail: THERAPIES SERIES
Discharge: HOME | End: 2019-10-28

## 2019-10-28 DIAGNOSIS — R26.9 GAIT ABNORMALITY: ICD-10-CM

## 2019-10-28 DIAGNOSIS — G89.29 CHRONIC BILATERAL LOW BACK PAIN WITH SCIATICA, SCIATICA LATERALITY UNSPECIFIED: ICD-10-CM

## 2019-10-28 DIAGNOSIS — M54.40 CHRONIC BILATERAL LOW BACK PAIN WITH SCIATICA, SCIATICA LATERALITY UNSPECIFIED: ICD-10-CM

## 2019-10-28 DIAGNOSIS — R29.898 WEAKNESS OF BOTH LOWER EXTREMITIES: Primary | ICD-10-CM

## 2019-10-28 PROCEDURE — 97112 NEUROMUSCULAR REEDUCATION: CPT

## 2019-10-28 PROCEDURE — 97530 THERAPEUTIC ACTIVITIES: CPT

## 2019-10-28 NOTE — THERAPY DISCHARGE NOTE
Outpatient Physical Therapy Ortho Treatment Note/Discharge Summary  [facility]     Patient Name: Marlon Longoria  : 1970  MRN: [id number]  Today's Date: 10/28/2019      Visit Date: 10/28/2019    Visit Dx:    ICD-10-CM ICD-9-CM   1. Weakness of both lower extremities R29.898 729.89   2. Chronic bilateral low back pain with sciatica, sciatica laterality unspecified M54.40 724.2    G89.29 724.3     338.29   3. Gait abnormality R26.9 781.2       Patient Active Problem List   Diagnosis   • Hypertension   • Hep C w/o coma, chronic (CMS/HCC)   • Encounter for immunization   • Dermatitis   • Back pain   • Abnormal transaminases   • Obesity   • Erectile dysfunction   • Weakness of both lower extremities   • Liver mass   • HTN (hypertension)   • Hepatitis C   • Secondary biliary cirrhosis (CMS/HCC)   • Abnormal CT of liver        Past Medical History:   Diagnosis Date   • Back pain    • Difficulty walking    • Hep C w/o coma, chronic (CMS/HCC)    • Hypertension         Past Surgical History:   Procedure Laterality Date   • WISDOM TOOTH EXTRACTION                                 OP Exercises     Row Name 10/28/19 1200 10/28/19 1100          Subjective Comments    Subjective Comments  --  Pt arrival time 1145, appointment time 1130.  -RS        Subjective Pain    Able to rate subjective pain?  --  yes  -RS     Pre-Treatment Pain Level  --  0  -RS        Total Minutes    62095 -  PT Neuromuscular Reeducation Minutes  --  15  -RS     83885 - PT Therapeutic Activity Minutes  --  14  -RS        Exercise 1    Exercise Name 1  Bike L5  -RS  --     Time 1  5 min  -RS  --        Exercise 2    Exercise Name 2  High knee slow march  -RS  --     Reps 2  // bars 4 lengths  -RS  --     Additional Comments  rare HHA  -RS  --        Exercise 3    Exercise Name 3  Tandem walk  -RS  --     Cueing 3  Demo  -RS  --     Reps 3  // bars 4 lengths  -RS  --     Time 3  fwd and retro intermittent hands  -RS  --     Additional Comments   "int HHA to no HHA  -RS  --        Exercise 4    Exercise Name 4  \"carioca\"  -RS  --     Reps 4  // bars 4 lengths  -RS  --     Additional Comments  int HHA to no HHA  -RS  --        Exercise 5    Exercise Name 5  2 min walk test  -RS  --        Exercise 7    Exercise Name 7  rhomberg  -RS  --     Reps 7  2  -RS  --     Time 7  30 seconds  -RS  --     Additional Comments  EC  -RS  --        Exercise 8    Exercise Name 8  --  -RS  --     Reps 8  --  -RS  --        Exercise 9    Exercise Name 9  --  -RS  --     Sets 9  --  -RS  --     Reps 9  --  -RS  --     Time 9  --  -RS  --        Exercise 10    Exercise Name 10  --  -RS  --     Reps 10  --  -RS  --        Exercise 11    Exercise Name 11  aquatic exercise review  -RS  --       User Key  (r) = Recorded By, (t) = Taken By, (c) = Cosigned By    Initials Name Provider Type    RS Ekaterina Almendarez, PT Physical Therapist                         PT OP Goals     Row Name 10/28/19 1200          PT Short Term Goals    STG Date to Achieve  08/06/19  -RS     STG 1  Patient will be independent with initial HEP.  -RS     STG 1 Progress  Met  -RS     STG 2  Patient will ambulate 100 ft with least resrictive AD and increasing elke.  -RS     STG 2 Progress  Met  -RS     STG 3  Patient will deny falls.  -RS     STG 3 Progress  Met  -RS        Long Term Goals    LTG Date to Achieve  09/05/19  -RS     LTG 1  Patient will be independent with progressive HEP for long term management of current condition.  -RS     LTG 1 Progress  Met  -RS     LTG 2  Patient will score </= 40% disability on the modified ADRIENNE to indicate improved perceived performance with ADLs.  -RS     LTG 2 Progress  Met  -RS     LTG 3  Patient will return to reciprocal stair climbing with single rail for safety PRN.  -RS     LTG 3 Progress  Met  -RS     LTG 4  Patient will demonstrate safe lifting/transfer mechanics for long term spine preservation and return to work.  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  -RS     LTG 5 Progress  Met  -RS     LTG 5 Progress Comments  303 feet  -RS     LTG 6  The pt will participate in recreational activities ([employer], class, aqua, etc) 3 days per week for improved functional activity tolerance and initiation of maitenance program.  -RS     LTG 6 Progress  Met  -RS     LTG 6 Progress Comments  2-3 days per week  -RS       User Key  (r) = Recorded By, (t) = Taken By, (c) = Cosigned By    Initials Name Provider Type    RS Ekaterina Almendarez, PT Physical Therapist               Outcome Measure Options: 2 Minute Walk Test, 5x Sit to Stand, Modifed Owestry  2 Minute Walk Test  Gait, Assistive Device: straight cane  Distance Ambulated in 2 Minutes: 303  Modified Oswestry  Modified Oswestry Score/Comments: 28%      Time Calculation:   Start Time: 1145  Stop Time: 1215  Time Calculation (min): 30 min  Therapy Charges for Today     Code Description Service Date Service Provider Modifiers Qty    84682050076  PT NEUROMUSC RE EDUCATION EA 15 MIN 10/28/2019 Ekaterina Almendarez, PT GP 1    [id number]  PT THERAPEUTIC ACT EA 15 MIN 10/28/2019 Ekaterina Almendarez, PT GP 1          PT G-Codes  Outcome Measure Options: 2 Minute Walk Test, 5x Sit to Stand, Modifed Owestry  Modified Oswestry Score/Comments: 28%     OP PT Discharge Summary  Date of Discharge: 10/28/19  Reason for Discharge: All goals achieved  Outcomes Achieved: Patient able to partially acheive established goals  Discharge Destination: Home with home program  Discharge Instructions/Additional Comments: Pt has met or partially met all goals this date. He reports he has been going to the [employer] 2-3 days per week working out in water and on land. He denies any adverse effects and has been noticing the leg numbness has  slowly been improving. He does not use his cane in the house but still uses it in the community. Noted improved functional activity tolerance and dynamic balance this date, specifically during rhomberg with EC and tandem walking. 2 min walk test distance increased from 180 to 303 feet. Overall, the pt progressed well and is appropriate and agreeable to DC this date.       Eakterina Almendarez, PT  10/28/2019

## 2019-11-02 ENCOUNTER — HOSPITAL ENCOUNTER (OUTPATIENT)
Dept: MRI IMAGING | Facility: HOSPITAL | Age: 49
Discharge: HOME | End: 2019-11-02
Admitting: PSYCHIATRY & NEUROLOGY

## 2019-11-02 PROCEDURE — 72157 MRI CHEST SPINE W/O & W/DYE: CPT

## 2019-11-02 PROCEDURE — 0 GADOBENATE DIMEGLUMINE 529 MG/ML SOLUTION: Performed by: PSYCHIATRY & NEUROLOGY

## 2019-11-02 PROCEDURE — A9577 INJ MULTIHANCE: HCPCS | Performed by: PSYCHIATRY & NEUROLOGY

## 2019-11-02 RX ADMIN — GADOBENATE DIMEGLUMINE 20 ML: 529 INJECTION, SOLUTION INTRAVENOUS at 12:06

## 2019-11-11 NOTE — PROGRESS NOTES
Marlon Longoria / 49 y.o. / male  Encounter Date: 11/12/2019    ASSESSMENT & PLAN:    Problem List Items Addressed This Visit        Digestive    Obesity       Nervous and Auditory    Weakness of both lower extremities - Primary    Relevant Orders    Ambulatory Referral to Neurosurgery      Other Visit Diagnoses     Lumbar spine pain        Relevant Medications    DULoxetine (CYMBALTA) 30 MG capsule    Other Relevant Orders    Ambulatory Referral to Neurosurgery    Bilateral lower extremity pain        Relevant Medications    DULoxetine (CYMBALTA) 30 MG capsule    Other Relevant Orders    Ambulatory Referral to Neurosurgery    Depression, unspecified depression type        Relevant Medications    DULoxetine (CYMBALTA) 30 MG capsule    hydrOXYzine pamoate (VISTARIL) 25 MG capsule    Anxiety about health        Relevant Medications    hydrOXYzine pamoate (VISTARIL) 25 MG capsule        Orders Placed This Encounter   Procedures   • Ambulatory Referral to Neurosurgery     New Medications Ordered This Visit   Medications   • DULoxetine (CYMBALTA) 30 MG capsule     Sig: Take 1 capsule by mouth Daily. If tolerating in 1 week, increase to 60 mg once daily.     Dispense:  30 capsule     Refill:  3   • hydrOXYzine pamoate (VISTARIL) 25 MG capsule     Sig: Take 1 capsule by mouth 3 (Three) Times a Day As Needed for Anxiety.     Dispense:  25 capsule     Refill:  0       Summary/Discussion:  1. Weakness of both lower extremities  - Ambulatory Referral to Neurosurgery    2. Lumbar spine pain  - Per patient request and preference, referral placed for neurosurgery for continuing back pain/spasms and ANNA LE weakness/cramping x 8 months. We will also try cymbalta to moderate not only depressive symptoms but neuropathic pain. He agreed to this plan.   - Ambulatory Referral to Neurosurgery  - DULoxetine (CYMBALTA) 30 MG capsule; Take 1 capsule by mouth Daily. If tolerating in 1 week, increase to 60 mg once daily.  Dispense: 30 capsule;  "Refill: 3    3. Bilateral lower extremity pain  - Ambulatory Referral to Neurosurgery  - DULoxetine (CYMBALTA) 30 MG capsule; Take 1 capsule by mouth Daily. If tolerating in 1 week, increase to 60 mg once daily.  Dispense: 30 capsule; Refill: 3    4. Class 3 severe obesity with serious comorbidity and body mass index (BMI) of 45.0 to 49.9 in adult, unspecified obesity type (CMS/HCC)  - Continue with exercise 4+ x weekly at [facility] and dietary improvements for weight loss. His weight has fluctuated significantly since onset of ANNA LE weakness earlier this year.     5. Depression, unspecified depression type  - Instructed patient to watch for worsening mood symptoms, suicidal thoughts/ideations.  - DULoxetine (CYMBALTA) 30 MG capsule; Take 1 capsule by mouth Daily. If tolerating in 1 week, increase to 60 mg once daily.  Dispense: 30 capsule; Refill: 3    6. Anxiety about health  - Use as needed for situational anxiety.   - hydrOXYzine pamoate (VISTARIL) 25 MG capsule; Take 1 capsule by mouth 3 (Three) Times a Day As Needed for Anxiety.  Dispense: 25 capsule; Refill: 0        Return in about 3 months (around 2/3/2020) for Check Up on chronic issues, 30 min appointment please.  ________________________________________________________________    VITALS:    Visit Vitals  /78   Pulse 103   Temp 96.7 °F (35.9 °C) (Temporal)   Ht 177.8 cm (70\")   Wt (!) 147 kg (324 lb)   SpO2 97%   BMI 46.49 kg/m²       BP Readings from Last 3 Encounters:   11/12/19 118/78   10/01/19 (!) 150/105   09/17/19 139/89     Wt Readings from Last 3 Encounters:   11/12/19 (!) 147 kg (324 lb)   11/12/19 (!) 147 kg (325 lb)   10/22/19 (!) 150 kg (330 lb)      Body mass index is 46.49 kg/m².    CC: Main reason(s) for today's visit: weakness of both Lower extremities and Hypertension      HPI    Patient is a 49 y.o. male who is here for 2 month follow up on chronic medical condition of ANNA LE weakness and pain x >6 months. He has been evaluated by " neurology, Dr. Liu, on 10/1/19 with further MRI testing with results as below. He saw Dr. Liu again this morning in office for plan following MRI results. According to the OV notes, there is no significant etiology from any of the imaging to explain the lumbar spine pain, ANNA LE weakness and leg cramping. He was advised to have a second opinion completed at [facility] neurology group. The patient has been improving since I last saw him, which he admits in our visit. He has been going to the [employer] most days of the week to work on his strength and improving his legs. He continues to have stiffness and lumbar spine pain/spasms with thigh cramping when he lays down or when he first gets up in the mornings. He improves with movement, but also fatigues easily. He is walking with cane assist today, which is improvement from our last visit.     MRI OF THE LUMBAR SPINE WITH AND WITHOUT CONTRAST 10/22/2019  IMPRESSION:  1. No change when compared to prior MRI of the lumbar spine 04/15/2019.  No acute abnormality is seen in the lumbar spine to account for  increasing lower extremity weakness.  2. Inferior tip of the conus is in posterior midline of the lumbar  thecal sac at the L2-3 interspace level which is borderline in location  probably lower limits of normal rather than slightly low-lying.  3. There is some mild lumbar spondylosis with some mild bilateral facet  overgrowth at L2-3 and L3-4, mild-to-moderate bilateral facet overgrowth  at L4-5 and mild disc space narrowing and disc desiccation at L5-S1 with  mild spurring into the right foramen with mild right bony foraminal  narrowing at L5-S1. The remainder of the lumbar spine MRI is normal with  no disc herniation and no significant canal or foraminal narrowing is  seen in the lumbar spine.    MRI EXAMINATION OF THE THORACIC SPINE WITH AND WITHOUT CONTRAST 11/2/19  IMPRESSION:  No evidence of disc herniation, cord signal abnormality or  abnormal enhancement. There is  mild disc desiccation and loss of disc  height at multiple levels as well as small disc osteophyte complex is  present at T5-T6 and T7-T8, unchanged.      Chronic essential hypertension:  Since prior visit: compliant with medication(s), does not check blood pressure at home, denies significant problems with medication(s), SBP < 120 and SBP < 110. Currently taking amlodipine (Norvase). He is exercising, but is minimally adherent to low sodium diet. He denies symptoms of chest pain/pressure, dyspnea, swelling, vision impairment. CVD risk factors include: advanced age (>55 for males, >65 for females), dyslipidemia, HTN, obesity (BMI>=30 kg/m2), and sedentary lifestyle. Use of agents associated with HTN: caffiene, alcohol, but no tobacco use. Most recent in-office blood pressure readings:   BP Readings from Last 3 Encounters:   11/12/19 118/78   10/01/19 (!) 150/105   09/17/19 139/89         Patient Care Team:  Aliyah Flores APRN as PCP - General (Nurse Practitioner)  ____________________________________________________________________    REVIEW OF SYSTEMS  As noted per HPI  Review of Systems   Respiratory: Negative.    Cardiovascular: Negative.    Musculoskeletal: Positive for back pain (lumbar spine pain and occasional spasms (positional)).   Neurological: Positive for weakness (ANNA LE, improving ) and numbness (ANNA bottom of feet, dullness, no burning or sharp pain). Negative for dizziness, tremors and light-headedness.   Psychiatric/Behavioral: Positive for sleep disturbance. Negative for agitation and suicidal ideas. The patient is nervous/anxious (frustration with physical ailments and progress).      PHYSICAL EXAMINATION    Physical Exam   Constitutional: He is oriented to person, place, and time. He appears well-developed and well-nourished. No distress.   Cardiovascular: Regular rhythm, normal heart sounds and normal pulses. Tachycardia present.   Pulmonary/Chest: Effort normal.   Musculoskeletal:         Lumbar back: He exhibits spasm (occasional). He exhibits no tenderness and no swelling.   Neurological: He is alert and oriented to person, place, and time. He displays no tremor. No sensory deficit. Coordination (cane assistance) abnormal.   Skin: Skin is warm and dry. He is not diaphoretic.   Psychiatric: He has a normal mood and affect.   Vitals reviewed.        REVIEWED DATA:    Labs:   Lab Results   Component Value Date     (L) 09/17/2019    K 3.8 09/17/2019    AST 17 09/17/2019    ALT 9 09/17/2019    BUN 9 09/17/2019    CREATININE 0.60 10/22/2019    CREATININE 0.76 09/17/2019    CREATININE 1.20 06/29/2019    EGFRIFNONA 145 06/03/2019    EGFRIFAFRI 132 09/17/2019       Lab Results   Component Value Date    GLUCOSE 112 (H) 09/17/2019    GLUCOSE 110 (H) 04/21/2019    GLUCOSE 109 (H) 04/20/2019    HGBA1C 6.14 (H) 04/17/2019       Lab Results   Component Value Date    LDL 82 08/30/2016    HDL 53 08/30/2016    TRIG 78 08/30/2016       Lab Results   Component Value Date    TSH 0.875 04/16/2019          Lab Results   Component Value Date    WBC 6.85 09/17/2019    HGB 16.5 09/17/2019    HGB 14.3 04/21/2019    HGB 14.4 04/20/2019     09/17/2019         Imaging:      Medical Tests:      Summary of old records / correspondence / consultant report:      Request outside records:   ______________________________________________________________________    ALLERGIES  No Known Allergies     MEDICATIONS  Current Outpatient Medications on File Prior to Visit   Medication Sig   • amLODIPine (NORVASC) 10 MG tablet Take 1 tablet by mouth Daily.   • busPIRone (BUSPAR) 7.5 MG tablet TAKE 1 TABLET BY MOUTH TWICE A DAY   • cadexomer iodine (IODOSORB) 0.9 % gel Apply  topically to the appropriate area as directed.   • [employer] D3 1000 units capsule Take 1,000 Units by mouth Daily.   • HYDROcodone-acetaminophen (NORCO) 7.5-325 MG per tablet Take 1 tablet by mouth Every 8 (Eight) Hours As Needed.   • hydroxychloroquine (PLAQUENIL)  200 MG tablet    • mupirocin (BACTROBAN) 2 % ointment Apply  topically to the appropriate area as directed.   • naproxen (NAPROSYN) 500 MG tablet    • ondansetron (ZOFRAN) 4 MG tablet Take 4 mg by mouth.   • polyethylene glycol (MIRALAX) packet Take 17 g by mouth.   • sennosides-docusate sodium (SENOKOT-S) 8.6-50 MG tablet Take 2 tablets by mouth.   • simethicone (MYLICON) 80 MG chewable tablet Chew 80 mg.   • traZODone (DESYREL) 50 MG tablet Take 25 mg by mouth every night at bedtime.   • triamcinolone (KENALOG) 0.1 % cream Apply 1 application topically to the appropriate area as directed 3 (Three) Times a Day As Needed for Irritation or Rash.   • [DISCONTINUED] diazePAM (VALIUM) 10 MG tablet 1 p.o. 1 hour prior to MRI.   • [DISCONTINUED] hydrOXYzine pamoate (VISTARIL) 25 MG capsule Take 1 capsule by mouth 3 (Three) Times a Day As Needed for Itching.   • [DISCONTINUED] melatonin 5 MG tablet tablet Take 1 tablet by mouth At Night As Needed (sleep).     No current facility-administered medications on file prior to visit.        PFSH:     The following portions of the patient's history were reviewed and updated as appropriate: Allergies / Current Medications / Past Medical History / Surgical History / Social History / Family History    PROBLEM LIST   Patient Active Problem List   Diagnosis   • Hypertension   • Hep C w/o coma, chronic (CMS/HCC)   • Encounter for immunization   • Dermatitis   • Back pain   • Abnormal transaminases   • Obesity   • Erectile dysfunction   • Weakness of both lower extremities   • Liver mass   • HTN (hypertension)   • Hepatitis C   • Secondary biliary cirrhosis (CMS/HCC)   • Abnormal CT of liver       PAST MEDICAL HISTORY  Past Medical History:   Diagnosis Date   • Back pain    • Difficulty walking    • Hep C w/o coma, chronic (CMS/HCC)    • Hypertension        SURGICAL HISTORY  Past Surgical History:   Procedure Laterality Date   • WISDOM TOOTH EXTRACTION         SOCIAL HISTORY  Social  History     Socioeconomic History   • Marital status: Single     Spouse name: Not on file   • Number of children: Not on file   • Years of education: Not on file   • Highest education level: Not on file   Occupational History   • Occupation: Mental health associate   Tobacco Use   • Smoking status: Former Smoker     Packs/day: 1.00     Years: 6.00     Pack years: 6.00     Types: Cigars     Start date:      Last attempt to quit: 2018     Years since quittin.8   • Smokeless tobacco: Never Used   • Tobacco comment: Pt declined medication at this time.   Substance and Sexual Activity   • Alcohol use: No   • Drug use: No   • Sexual activity: Defer   Social History Narrative    Single, children ×2       FAMILY HISTORY  Family History   Problem Relation Age of Onset   • Stroke Mother    • Heart disease Mother    • Diabetes Mother    • Hypertension Mother    • Heart disease Father

## 2019-11-12 ENCOUNTER — OFFICE VISIT (OUTPATIENT)
Dept: NEUROLOGY | Facility: CLINIC | Age: 49
End: 2019-11-12

## 2019-11-12 ENCOUNTER — OFFICE VISIT (OUTPATIENT)
Dept: INTERNAL MEDICINE | Age: 49
End: 2019-11-12

## 2019-11-12 VITALS — WEIGHT: 315 LBS | HEIGHT: 70 IN | BODY MASS INDEX: 45.1 KG/M2 | HEART RATE: 124 BPM | OXYGEN SATURATION: 98 %

## 2019-11-12 VITALS
BODY MASS INDEX: 45.1 KG/M2 | HEIGHT: 70 IN | TEMPERATURE: 96.7 F | HEART RATE: 103 BPM | SYSTOLIC BLOOD PRESSURE: 118 MMHG | OXYGEN SATURATION: 97 % | WEIGHT: 315 LBS | DIASTOLIC BLOOD PRESSURE: 78 MMHG

## 2019-11-12 DIAGNOSIS — M54.41 CHRONIC BILATERAL LOW BACK PAIN WITH BILATERAL SCIATICA: ICD-10-CM

## 2019-11-12 DIAGNOSIS — M79.605 BILATERAL LOWER EXTREMITY PAIN: ICD-10-CM

## 2019-11-12 DIAGNOSIS — R29.898 WEAKNESS OF BOTH LOWER EXTREMITIES: Primary | ICD-10-CM

## 2019-11-12 DIAGNOSIS — F41.8 ANXIETY ABOUT HEALTH: ICD-10-CM

## 2019-11-12 DIAGNOSIS — F32.A DEPRESSION, UNSPECIFIED DEPRESSION TYPE: ICD-10-CM

## 2019-11-12 DIAGNOSIS — M54.50 LUMBAR SPINE PAIN: ICD-10-CM

## 2019-11-12 DIAGNOSIS — G89.29 CHRONIC BILATERAL LOW BACK PAIN WITH BILATERAL SCIATICA: ICD-10-CM

## 2019-11-12 DIAGNOSIS — G89.29 CHRONIC BILATERAL THORACIC BACK PAIN: ICD-10-CM

## 2019-11-12 DIAGNOSIS — M54.6 CHRONIC BILATERAL THORACIC BACK PAIN: ICD-10-CM

## 2019-11-12 DIAGNOSIS — M54.42 CHRONIC BILATERAL LOW BACK PAIN WITH BILATERAL SCIATICA: ICD-10-CM

## 2019-11-12 DIAGNOSIS — E66.01 CLASS 3 SEVERE OBESITY WITH SERIOUS COMORBIDITY AND BODY MASS INDEX (BMI) OF 45.0 TO 49.9 IN ADULT, UNSPECIFIED OBESITY TYPE (HCC): ICD-10-CM

## 2019-11-12 DIAGNOSIS — M79.604 BILATERAL LOWER EXTREMITY PAIN: ICD-10-CM

## 2019-11-12 DIAGNOSIS — R20.0 LOWER EXTREMITY NUMBNESS: ICD-10-CM

## 2019-11-12 PROCEDURE — 99213 OFFICE O/P EST LOW 20 MIN: CPT | Performed by: PSYCHIATRY & NEUROLOGY

## 2019-11-12 PROCEDURE — 99214 OFFICE O/P EST MOD 30 MIN: CPT | Performed by: NURSE PRACTITIONER

## 2019-11-12 RX ORDER — DULOXETIN HYDROCHLORIDE 30 MG/1
30 CAPSULE, DELAYED RELEASE ORAL DAILY
Qty: 30 CAPSULE | Refills: 3 | Status: SHIPPED | OUTPATIENT
Start: 2019-11-12

## 2019-11-12 RX ORDER — HYDROXYZINE PAMOATE 25 MG/1
25 CAPSULE ORAL 3 TIMES DAILY PRN
Qty: 25 CAPSULE | Refills: 0 | Status: SHIPPED | OUTPATIENT
Start: 2019-11-12

## 2019-11-12 NOTE — PROGRESS NOTES
Chief Complaint   Patient presents with   • Weakness of Lower Extremities   • 1 Month f/u       Patient ID: Marlon Longoria is a 49 y.o. male.    HPI:I have had the pleasure of seeing your patient again today.  As you may know he is a 49-year-old gentleman who I have seen in the neurology clinic here for history of bilateral lower extremity pain as well as numbness and tingling and weakness.  He continues to have symptoms.  He states that he tries to do some exercises.  It is hard for him to get through them.  He did have the MRI of his thoracic and lumbosacral spines.  The MRI of his lumbosacral spine was essentially unchanged from his previous study from 4/15/2019.  He does have some mild lumbar spondylosis and some disc space narrowing at multiple levels.  The inferior tip of the conus appears to be posterior midline of the lumbar thecal sac at L2-3 interspace level which is borderline in location probably lower limits of normal rather than slightly low-lying.  He has had imaging of his cervical spine in the past.  He is also had EMG/nerve conduction studies while symptoms were occurring.  It was within normal limits.  His cervical spine imaging did show a disc protrusion that abuts the thecal sac however no spinal cord injury or impingement.  He continues to have symptoms that appear to affect his quality of life.  Thus far testing does not give us any type of indication as to what could be causing his symptoms.    The following portions of the patient's history were reviewed and updated as appropriate: allergies, current medications, past family history, past medical history, past social history, past surgical history and problem list.    Review of Systems   Constitutional: Negative for activity change, appetite change and fatigue.   HENT: Negative for facial swelling, hearing loss and trouble swallowing.    Eyes: Negative for photophobia, redness and visual disturbance.   Respiratory: Negative for chest  tightness, shortness of breath and wheezing.    Cardiovascular: Negative for chest pain, palpitations and leg swelling.   Gastrointestinal: Negative for abdominal pain, nausea and vomiting.   Endocrine: Negative for cold intolerance, heat intolerance and polydipsia.   Musculoskeletal: Negative for back pain, gait problem and neck pain.   Skin: Negative for color change, rash and wound.   Allergic/Immunologic: Negative for environmental allergies, food allergies and immunocompromised state.   Neurological: Positive for weakness (legs are about the same from the last time he was seen pt states. ) and numbness (tingling in both lower extremities ). Negative for dizziness, tremors, seizures, syncope, facial asymmetry, speech difficulty, light-headedness and headaches.   Hematological: Negative for adenopathy. Does not bruise/bleed easily.   Psychiatric/Behavioral: Positive for agitation and sleep disturbance (occasionally not able to sleep at all ). Negative for behavioral problems, confusion, decreased concentration, dysphoric mood, hallucinations, self-injury and suicidal ideas. The patient is nervous/anxious (occasionally ). The patient is not hyperactive.       I reviewed and agree with the above review of systems completed by the medical assistant.    Vitals:    11/12/19 1024   Pulse: (!) 124   SpO2: 98%       Neurologic Exam     Mental Status   Oriented to person, place, and time.   Concentration: normal.   Level of consciousness: alert  Knowledge: consistent with education (No deficits found.).     Cranial Nerves     CN II   Visual fields full to confrontation.     CN III, IV, VI   Pupils are equal, round, and reactive to light.  Extraocular motions are normal.   CN III: no CN III palsy  CN VI: no CN VI palsy    CN V   Facial sensation intact.     CN VII   Facial expression full, symmetric.     CN VIII   CN VIII normal.     CN IX, X   CN IX normal.   CN X normal.     CN XI   CN XI normal.     CN XII   CN XII  normal.     Motor Exam     Strength   Right neck flexion: 5/5  Left neck flexion: 5/5  Right neck extension: 5/5  Left neck extension: 5/5  Right deltoid: 5/5  Left deltoid: 5/5  Right biceps: 5/5  Left biceps: 5/5  Right triceps: 5/5  Left triceps: 5/5  Right wrist flexion: 5/5  Left wrist flexion: 5/5  Right wrist extension: 5/5  Left wrist extension: 5/5  Right interossei: 5/5  Left interossei: 5/5  Right abdominals: 5/5  Left abdominals: 5/5  Right iliopsoas: 5/5  Left iliopsoas: 5/5  Right quadriceps: 5/5  Left quadriceps: 5/5  Right hamstrin/5  Left hamstrin/5  Right glutei: 5/5  Left glutei: 5/5  Right anterior tibial: 5/5  Left anterior tibial: 5/5  Right posterior tibial: 5/5  Left posterior tibial: 5/5  Right peroneal: 5/5  Left peroneal: 5/5  Right gastroc: 5/5  Left gastroc: 5/5    Sensory Exam   Light touch normal.   Vibration normal.     Gait, Coordination, and Reflexes     Gait  Gait: normal    Reflexes   Right brachioradialis: 2+  Left brachioradialis: 2+  Right biceps: 2+  Left biceps: 2+  Right triceps: 2+  Left triceps: 2+  Right patellar: 2+  Left patellar: 2+  Right achilles: 2+  Left achilles: 2+  Right : 2+  Left : 2+Station is normal.       Physical Exam   Constitutional: He is oriented to person, place, and time. He appears well-developed and well-nourished.   HENT:   Head: Normocephalic and atraumatic.   Eyes: EOM are normal. Pupils are equal, round, and reactive to light.   Cardiovascular: Normal rate and regular rhythm.   Pulmonary/Chest: Breath sounds normal.   Musculoskeletal: Normal range of motion.   Neurological: He is oriented to person, place, and time. Gait normal.   Reflex Scores:       Tricep reflexes are 2+ on the right side and 2+ on the left side.       Bicep reflexes are 2+ on the right side and 2+ on the left side.       Brachioradialis reflexes are 2+ on the right side and 2+ on the left side.       Patellar reflexes are 2+ on the right side and 2+ on the  left side.       Achilles reflexes are 2+ on the right side and 2+ on the left side.  Skin: Skin is warm.   Vitals reviewed.      Procedures    Assessment/Plan: At this point I am unable to understand the etiology of his symptoms.  He does agreed to a second opinion at the [facility] for any other potential ideas as to what could be causing his symptoms.  At this point it is possible that he could have some type of somatization issue.       Marlon was seen today for weakness of lower extremities and 1 month f/u.    Diagnoses and all orders for this visit:    Weakness of both lower extremities  -     Ambulatory Referral to Neurology    Lower extremity numbness  -     Ambulatory Referral to Neurology    Chronic bilateral thoracic back pain  -     Ambulatory Referral to Neurology    Chronic bilateral low back pain with bilateral sciatica  -     Ambulatory Referral to Neurology           Marty Liu II, MD

## 2020-01-01 DIAGNOSIS — L93.0 LUPUS ERYTHEMATOSUS, UNSPECIFIED FORM: ICD-10-CM

## 2020-01-01 RX ORDER — TRIAMCINOLONE ACETONIDE 1 MG/G
1 CREAM TOPICAL 3 TIMES DAILY PRN
Qty: 45 G | Refills: 3 | Status: SHIPPED | OUTPATIENT
Start: 2020-01-01

## 2020-01-10 ENCOUNTER — OFFICE VISIT (OUTPATIENT)
Dept: INTERNAL MEDICINE | Age: 50
End: 2020-01-10

## 2020-01-10 VITALS
DIASTOLIC BLOOD PRESSURE: 90 MMHG | BODY MASS INDEX: 45.1 KG/M2 | HEART RATE: 119 BPM | TEMPERATURE: 97.6 F | OXYGEN SATURATION: 94 % | SYSTOLIC BLOOD PRESSURE: 132 MMHG | WEIGHT: 315 LBS | HEIGHT: 70 IN

## 2020-01-10 DIAGNOSIS — F32.A DEPRESSION, UNSPECIFIED DEPRESSION TYPE: ICD-10-CM

## 2020-01-10 DIAGNOSIS — R29.898 WEAKNESS OF BOTH LOWER EXTREMITIES: Primary | ICD-10-CM

## 2020-01-10 DIAGNOSIS — I10 ESSENTIAL HYPERTENSION: ICD-10-CM

## 2020-01-10 PROCEDURE — 99214 OFFICE O/P EST MOD 30 MIN: CPT | Performed by: NURSE PRACTITIONER

## 2020-01-10 NOTE — PROGRESS NOTES
"Marlon Longoria / 49 y.o. / male  Encounter Date: 01/10/2020    ASSESSMENT & PLAN:    Problem List Items Addressed This Visit        Cardiovascular and Mediastinum    Hypertension    Relevant Medications    amLODIPine (NORVASC) 10 MG tablet       Nervous and Auditory    Weakness of both lower extremities - Primary      Other Visit Diagnoses     Depression, unspecified depression type            No orders of the defined types were placed in this encounter.    No orders of the defined types were placed in this encounter.      Summary/Discussion:  1. Weakness of both lower extremities  -Work release form provided today.  Advised patient to follow-up with our office or neurology in the future if weakness returns or new symptoms appear.  Continue with exercises daily, work on strength and mobility and walking frequently to avoid stiffness or pain recurrence.    2. Essential hypertension  -Blood pressure stable today, he is taking medication without complication.  Advised him to continue with low-sodium diet, exercise and efforts towards weight loss.  Follow-up in 6 months.    3. Depression, unspecified depression type  -Overall improved, continue current medications as prescribed.  Return to clinic if symptoms worsen.  Follow-up in 6 months.      Return in about 6 months (around 7/10/2020) for Follow up with KELLEE Meyers.  ________________________________________________________________    VITALS:    Visit Vitals  /90   Pulse 119   Temp 97.6 °F (36.4 °C) (Temporal)   Ht 177.8 cm (70\")   Wt (!) 147 kg (325 lb)   SpO2 94%   BMI 46.63 kg/m²       BP Readings from Last 3 Encounters:   01/10/20 132/90   11/12/19 118/78   10/01/19 (!) 150/105     Wt Readings from Last 3 Encounters:   01/10/20 (!) 147 kg (325 lb)   11/12/19 (!) 147 kg (324 lb)   11/12/19 (!) 147 kg (325 lb)      Body mass index is 46.63 kg/m².    CC: Main reason(s) for today's visit: Extremity Weakness and Hypertension      HPI    Patient is a 49 y.o. " male who is here for 3 month follow up on ANNA LE weakness, depression and anxiety, and HTN.     Chronic essential hypertension:  Since prior visit: compliant with medication(s), does not check blood pressure at home, denies significant problems with medication(s) and SBP < 130.  Most recent in-office blood pressure readings:   BP Readings from Last 3 Encounters:   01/10/20 132/90   11/12/19 118/78   10/01/19 (!) 150/105     Depression/anxiety: overall improved since his mobility is improving. He continues to be well controlled on current Cymbalta at 30 mg daily. He denies worsening depression or anxiety, suicidal thoughts or self harm.     ANNA LE weakness: improved. Patient is walking consistently with a cane PRN, and continues to participate in [employer] exercise classes PRN. He denies continued weakness, numbness or pain today. He is requesting release to return to work. 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       Patient Care Team:  Aliyah Flores APRN as PCP - General (Nurse Practitioner)  ____________________________________________________________________    REVIEW OF SYSTEMS    Review of Systems  As noted per HPI  Constitutional neg  Resp neg  CV neg     PHYSICAL EXAMINATION    Physical Exam   Constitutional: He is oriented to person, place, and time. He appears well-developed and well-nourished. No distress.   Cardiovascular: Normal rate, regular rhythm and normal heart sounds.   Pulmonary/Chest: Effort normal and breath sounds normal. He has no wheezes.   Musculoskeletal: Normal range of motion.   Walks with cane as needed, demonstrated stability and balance without cane.    Neurological: He is alert and oriented to person, place, and time. No sensory deficit. Coordination normal.   Skin: Skin is warm and dry.   Psychiatric: He has a normal mood and affect.   Vitals reviewed.    REVIEWED DATA:    Labs:   Lab Results   Component Value Date     (L) 09/17/2019    K 3.8 09/17/2019    AST 17 09/17/2019    ALT 9  09/17/2019    BUN 9 09/17/2019    CREATININE 0.60 10/22/2019    CREATININE 0.76 09/17/2019    CREATININE 1.20 06/29/2019    EGFRIFNONA 145 06/03/2019    EGFRIFAFRI 132 09/17/2019       Lab Results   Component Value Date    GLUCOSE 112 (H) 09/17/2019    GLUCOSE 110 (H) 04/21/2019    GLUCOSE 109 (H) 04/20/2019    HGBA1C 6.14 (H) 04/17/2019       Lab Results   Component Value Date    LDL 82 08/30/2016    HDL 53 08/30/2016    TRIG 78 08/30/2016       Lab Results   Component Value Date    TSH 0.875 04/16/2019          Lab Results   Component Value Date    WBC 6.85 09/17/2019    HGB 16.5 09/17/2019    HGB 14.3 04/21/2019    HGB 14.4 04/20/2019     09/17/2019         Imaging:      Medical Tests:      Summary of old records / correspondence / consultant report:      Request outside records:   ______________________________________________________________________    ALLERGIES  No Known Allergies     MEDICATIONS  Current Outpatient Medications on File Prior to Visit   Medication Sig   • amLODIPine (NORVASC) 10 MG tablet Take 1 tablet by mouth Daily.   • busPIRone (BUSPAR) 7.5 MG tablet TAKE 1 TABLET BY MOUTH TWICE A DAY   • cadexomer iodine (IODOSORB) 0.9 % gel Apply  topically to the appropriate area as directed.   • [employer] D3 1000 units capsule Take 1,000 Units by mouth Daily.   • DULoxetine (CYMBALTA) 30 MG capsule Take 1 capsule by mouth Daily. If tolerating in 1 week, increase to 60 mg once daily.   • HYDROcodone-acetaminophen (NORCO) 7.5-325 MG per tablet Take 1 tablet by mouth Every 8 (Eight) Hours As Needed.   • hydroxychloroquine (PLAQUENIL) 200 MG tablet    • hydrOXYzine pamoate (VISTARIL) 25 MG capsule Take 1 capsule by mouth 3 (Three) Times a Day As Needed for Anxiety.   • mupirocin (BACTROBAN) 2 % ointment Apply  topically to the appropriate area as directed.   • naproxen (NAPROSYN) 500 MG tablet    • ondansetron (ZOFRAN) 4 MG tablet Take 4 mg by mouth.   • polyethylene glycol (MIRALAX) packet Take 17 g by  mouth.   • sennosides-docusate sodium (SENOKOT-S) 8.6-50 MG tablet Take 2 tablets by mouth.   • simethicone (MYLICON) 80 MG chewable tablet Chew 80 mg.   • traZODone (DESYREL) 50 MG tablet Take 25 mg by mouth every night at bedtime.   • triamcinolone (KENALOG) 0.1 % cream Apply 1 application topically to the appropriate area as directed 3 (Three) Times a Day As Needed for Irritation or Rash.     No current facility-administered medications on file prior to visit.        PFSH:     The following portions of the patient's history were reviewed and updated as appropriate: Allergies / Current Medications / Past Medical History / Surgical History / Social History / Family History    PROBLEM LIST   Patient Active Problem List   Diagnosis   • Hypertension   • Hep C w/o coma, chronic (CMS/HCC)   • Encounter for immunization   • Dermatitis   • Back pain   • Abnormal transaminases   • Obesity   • Erectile dysfunction   • Weakness of both lower extremities   • Liver mass   • HTN (hypertension)   • Hepatitis C   • Secondary biliary cirrhosis (CMS/HCC)   • Abnormal CT of liver       PAST MEDICAL HISTORY  Past Medical History:   Diagnosis Date   • Back pain    • Difficulty walking    • Hep C w/o coma, chronic (CMS/HCC)    • Hypertension        SURGICAL HISTORY  Past Surgical History:   Procedure Laterality Date   • WISDOM TOOTH EXTRACTION         SOCIAL HISTORY  Social History     Socioeconomic History   • Marital status: Single     Spouse name: Not on file   • Number of children: Not on file   • Years of education: Not on file   • Highest education level: Not on file   Occupational History   • Occupation: Mental health associate   Tobacco Use   • Smoking status: Former Smoker     Packs/day: 1.00     Years: 6.00     Pack years: 6.00     Types: Cigars     Start date:      Last attempt to quit: 2018     Years since quittin.0   • Smokeless tobacco: Never Used   • Tobacco comment: Pt declined medication at this time.    Substance and Sexual Activity   • Alcohol use: No   • Drug use: No   • Sexual activity: Defer   Social History Narrative    Single, children ×2       FAMILY HISTORY  Family History   Problem Relation Age of Onset   • Stroke Mother    • Heart disease Mother    • Diabetes Mother    • Hypertension Mother    • Heart disease Father

## 2021-01-01 ENCOUNTER — HOSPITAL ENCOUNTER (EMERGENCY)
Facility: HOSPITAL | Age: 51
End: 2021-04-01
Attending: EMERGENCY MEDICINE | Admitting: EMERGENCY MEDICINE

## 2021-01-01 ENCOUNTER — HOSPITAL ENCOUNTER (EMERGENCY)
Facility: HOSPITAL | Age: 51
End: 2021-04-01

## 2021-01-01 VITALS — WEIGHT: 315 LBS

## 2021-01-01 DIAGNOSIS — I46.9 CARDIAC ARREST (HCC): Primary | ICD-10-CM

## 2021-01-01 PROCEDURE — 92950 HEART/LUNG RESUSCITATION CPR: CPT

## 2021-01-01 PROCEDURE — 99284 EMERGENCY DEPT VISIT MOD MDM: CPT

## 2021-04-01 NOTE — ED TRIAGE NOTES
Pt found down by friend, EMS called and started CPR. EMS treated with 100meq Bicarb, defibrillated twice, Epinephrine x 8, and I-gel enroute to ER.

## 2021-04-01 NOTE — ED PROVIDER NOTES
EMERGENCY DEPARTMENT ENCOUNTER    Room Number:  16/16  Date of encounter:  4/1/2021  PCP: Provider, No Known  Patient Care Team:  Provider, No Known as PCP - General   Historian: EMS    HPI:  Chief Complaint: Cardiac arrest  A complete HPI/ROS/PMH/PSH/SH/FH are unobtainable due to: Patient's cardiac arrest    Context: Marlon Longoria is a 50 y.o. male who presents to the ED c/o cardiac arrest.  EMS reports that he was found down by his friend after not seeing him for an hour.  EMS found him in cardiac arrest.  They placed a blindly inserted airway.  They initiated CPR.  He received 40 minutes of resuscitative efforts including multiple rounds of epinephrine.  He received bicarbonate.  He received 2 shocks 1 for V. fib and 1 for V. tach.  His predominant rhythm was PEA.  He arrives after 40 minutes of CPR in asystole without a pulse and with fixed and dilated pupils.  EMS reports that he never had a pulse with them.  His end-tidal CO2 initially was 70 but upon arrival was 17.  EMS reports he has a history of drug use.    Prior record review: Not available due to patient's condition and unknown identifiers    PAST MEDICAL HISTORY  Active Ambulatory Problems     Diagnosis Date Noted   • No Active Ambulatory Problems     Resolved Ambulatory Problems     Diagnosis Date Noted   • No Resolved Ambulatory Problems     No Additional Past Medical History         PAST SURGICAL HISTORY  No past surgical history on file.      FAMILY HISTORY  No family history on file.      SOCIAL HISTORY  Social History     Socioeconomic History   • Marital status: Single     Spouse name: Not on file   • Number of children: Not on file   • Years of education: Not on file   • Highest education level: Not on file         ALLERGIES  Patient has no allergy information on record.        REVIEW OF SYSTEMS  Review of Systems   Unavailable  All systems reviewed and negative except for those discussed in HPI.       PHYSICAL EXAM    I have reviewed the  triage vital signs and nursing notes.    ED Triage Vitals [04/01/21 1642]   Temp Heart Rate Resp BP SpO2   -- (!) 0 (!) 0 (!) 0/0 --      Temp src Heart Rate Source Patient Position BP Location FiO2 (%)   -- -- -- -- --       Physical Exam  GENERAL: Unresponsive, ill-appearing, no spontaneous movements  SKIN: Cool, dry  HENT: Normocephalic, atraumatic  EYES: no scleral icterus pupils fixed and dilated  CV: Absent  RESPIRATORY: Lungs clear with bag valve ventilation, equal breath sounds, no spontaneous breathing  ABDOMEN: soft, nondistended  MUSCULOSKELETAL: no deformity, tibial IO  NEURO: No spontaneous movement          LAB RESULTS  No results found for this or any previous visit (from the past 24 hour(s)).    Ordered the above labs and independently reviewed the results.        RADIOLOGY  No Radiology Exams Resulted Within Past 24 Hours    I ordered the above noted radiological studies. Reviewed by me and discussed with radiologist.  See dictation for official radiology interpretation.      PROCEDURES    Critical Care  Performed by: Asa Ojeda MD  Authorized by: Asa Ojeda MD     Critical care provider statement:     Critical care time was exclusive of:  Separately billable procedures and treating other patients    Critical care was necessary to treat or prevent imminent or life-threatening deterioration of the following conditions:  Cardiac failure    Critical care was time spent personally by me on the following activities:  Evaluation of patient's response to treatment, examination of patient and pulse oximetry  Comments:      <30 minutes          MEDICATIONS GIVEN IN ER    Medications - No data to display      PROGRESS, DATA ANALYSIS, CONSULTS, AND MEDICAL DECISION MAKING    All labs have been independently reviewed by me.  All radiology studies have been reviewed by me and discussed with radiologist dictating the report.   EKG's independently viewed and interpreted by me.  Discussion below  represents my analysis of pertinent findings related to patient's condition, differential diagnosis, treatment plan and final disposition.        ED Course as of 2244   Thu 2021   1657 Given the patient has been down for greater than 40 minutes, has received resuscitative efforts for 40 minutes, has no signs of life, and has not responded to any resuscitative efforts, time of death was called upon patient's arrival.  He was in asystole.  He had no pulse.    [TR]   1659 Time of death 1641.    [TR]    Speaking with the daughters of the patient by phone.  Adrianna and Irvin.   had contacted them regarding the patient's death.  They were calling to try to arrange to get him back to [place].    [TR]      ED Course User Index  [TR] Asa Ojeda MD           PPE: Both the patient and I wore a CAPR throughout the entire patient encounter. I wore protective goggles.  I wore a gown.      AS OF 22:44 EDT VITALS:    BP - (!) 0/0  HR - (!) 0  TEMP -    O2 SATS -          DIAGNOSIS  Final diagnoses:   Cardiac arrest (CMS/[facility])         DISPOSITION  ED Disposition     ED Disposition Condition Comment                      Asa Ojeda MD  21 [id number]       Asa Ojeda MD  [id number]

## 2021-04-01 NOTE — ED NOTES
On EMS's arrival, pt did not have any ID or wallet on his person but medication bottles found on person. [facility] pharmacy contacted to confirm pt's  per the medications. Visual confirmation made by EMS crew on pt's identity     Mimi Bonilla RN  [phone]

## 2024-11-10 NOTE — PROGRESS NOTES
"Marlon Longoria / 46 y.o. / male  02/24/2017    VITALS    Visit Vitals   • /94   • Pulse 96   • Temp 96.7 °F (35.9 °C)   • Ht 70\" (177.8 cm)   • Wt (!) 331 lb (150 kg)   • SpO2 97%   • BMI 47.49 kg/m2     BP Readings from Last 3 Encounters:   02/24/17 134/94   01/26/17 126/72   10/24/16 144/88     Wt Readings from Last 3 Encounters:   02/24/17 (!) 331 lb (150 kg)   01/26/17 (!) 331 lb (150 kg)   10/24/16 (!) 329 lb (149 kg)      Body mass index is 47.49 kg/(m^2).    CC:  Main reason(s) for today's visit: Hypertension      HPI:     Patient presents today for six-month follow-up of several medical issues.    Hypertension: He is compliant with medication, and dietary salt restriction.  He has gained weight from ongoing treatment for hepatitis C.  He does walk at work for exercise.    Weight control: Patient is aware of the need for reducing caloric intake as well as exercise to maximize his weight loss efforts.    Interval history: He has been seen by gastroenterology and treated for hepatitis C according to patient the viral load has been eliminated.  Follow-up with GI as scheduled for end of March for repeat lab work    ED: Noted for approximately one year.  Problems intermittent.  His difficulty with obtaining as well as maintaining erection.  He will awaken with erections that time in the morning.  He is interested in trying sample of Viagra class medication.    ______________________________________________________    ASSESSMENT & PLAN:    1. Essential hypertension    2. Obesity, unspecified obesity severity, unspecified obesity type    3. Erectile dysfunction, unspecified erectile dysfunction type      No orders of the defined types were placed in this encounter.      Summary/Discussion:     · Number-one hypertension needs improved control, increase amlodipine to 5 mg a day, blood pressure check 6 weeks  ·   · #2 weight control, stress importance of dietary restrictions and effort to help with maximizing " weight loss efforts.  Patient would like to obtain a stress test before beginning exercise regimen which I think is very logical.  He has a significant family history of heart disease and stroke.  After we maximize the control his blood pressure, we will set him up for stress test at our next visit.  We also did discuss the HMR diet at [facility].  ·     ·     #3 ED sample Cialis 5 mg, patient is advised to use 2 tablets at once proximal a half an hour before planned need. Side effects discussed with patient including contraindication use with nitroglycerin..      No Follow-up on file.    Future Appointments  Date Time Provider Department Center   3/23/2017 8:00 AM Mimi Kearney MD [facility] None       ______________________________________________________    REVIEW OF SYSTEMS    Review of Systems   Respiratory: Negative for chest tightness and shortness of breath.    Cardiovascular: Negative for chest pain and palpitations.   Neurological: Negative for dizziness, syncope, speech difficulty, weakness, light-headedness and headaches.         PHYSICAL EXAMINATION    Physical Exam   Constitutional: He is oriented to person, place, and time. He appears well-developed. No distress.   obese   Cardiovascular: Normal rate, regular rhythm, normal heart sounds and intact distal pulses.  Exam reveals no gallop and no friction rub.    No murmur heard.  Pulmonary/Chest: Effort normal and breath sounds normal. He has no wheezes. He has no rales.   Musculoskeletal: He exhibits no edema.   Neurological: He is alert and oriented to person, place, and time.   Skin: Skin is warm and dry. No rash noted.   Psychiatric: He has a normal mood and affect. His behavior is normal. Judgment and thought content normal.   Nursing note and vitals reviewed.      REVIEWED DATA:    Labs:   Lab Results   Component Value Date     01/26/2017    K 4.2 01/26/2017    AST 23 01/26/2017    ALT 14 01/26/2017    BUN 9 01/26/2017     CREATININE 0.73 (L) 01/26/2017    CREATININE 0.74 (L) 10/24/2016    CREATININE 0.67 (L) 08/30/2016    EGFRIFNONA 115 01/26/2017    EGFRIFAFRI 140 01/26/2017       Lab Results   Component Value Date     (H) 01/26/2017    GLU 93 08/30/2016       Lab Results   Component Value Date    LDL 82 08/30/2016    HDL 53 08/30/2016    TRIG 78 08/30/2016       No results found for: TSH, FREET4       Lab Results   Component Value Date    WBC 8.05 01/26/2017    HGB 14.3 01/26/2017    HGB 14.4 10/24/2016     01/26/2017        Imaging:        Medical Tests:        Summary of old records / correspondence / consultant report:        Request outside records:          ALLERGIES:    Review of patient's allergies indicates no known allergies.    MEDICATIONS:       Outpatient Medications Prior to Visit   Medication Sig Dispense Refill   • ALA-POOJA 3-0.5 % cream 1 application 2 (Two) Times a Day.  1   • amLODIPine (NORVASC) 2.5 MG tablet Take 1 tablet by mouth Daily. 90 tablet 3   • fluocinolone (SYNALAR) 0.025 % cream Apply  topically 2 (Two) Times a Day.  0   • triamcinolone (KENALOG) 0.1 % cream Apply 1 application topically daily. 15 g 5     No facility-administered medications prior to visit.        PFS    The following portions of the patient's history were reviewed and updated as appropriate: Allergies / Current Medications / Past Medical History / Surgical History / Social History / Family History    PROBLEM LIST:    Patient Active Problem List   Diagnosis   • Hypertension   • Hep C w/o coma, chronic   • Encounter for immunization   • Dermatitis   • Back pain   • Abnormal transaminases   • Obesity   • Erectile dysfunction       PAST MEDICAL HX:    Past Medical History   Diagnosis Date   • Back pain    • Hep C w/o coma, chronic    • Hypertension        PAST SURGICAL HX:    History reviewed. No pertinent past surgical history.    SOCIAL HX:    Social History     Social History   • Marital status: Single     Spouse name:  N/A   • Number of children: N/A   • Years of education: N/A     Occupational History   • Mental health associate      Social History Main Topics   • Smoking status: Heavy Tobacco Smoker     Packs/day: 1.00     Years: 6.00     Types: Cigars     Start date: 2010   • Smokeless tobacco: Never Used      Comment: Pt declined medication at this time.   • Alcohol use No   • Drug use: Yes      Comment: pain pills   • Sexual activity: Defer     Other Topics Concern   • None     Social History Narrative    Single, children ×2       FAMILY HX:    Family History   Problem Relation Age of Onset   • Stroke Mother    • Heart disease Mother    • Heart disease Father          **Janineon Disclaimer:   Much of this encounter note is an electronic transcription/translation of spoken language to printed text. The electronic translation of spoken language may permit erroneous, or at times, nonsensical words or phrases to be inadvertently transcribed. Although I have reviewed the note for such errors, some may still exist.    Pulmonary hypertension